# Patient Record
Sex: FEMALE | Race: WHITE | NOT HISPANIC OR LATINO | Employment: OTHER | URBAN - METROPOLITAN AREA
[De-identification: names, ages, dates, MRNs, and addresses within clinical notes are randomized per-mention and may not be internally consistent; named-entity substitution may affect disease eponyms.]

---

## 2023-02-07 PROBLEM — M06.9 RHEUMATOID ARTHRITIS (MULTI): Status: ACTIVE | Noted: 2023-02-07

## 2023-02-07 PROBLEM — H93.19 TINNITUS: Status: ACTIVE | Noted: 2023-02-07

## 2023-02-07 PROBLEM — I10 ESSENTIAL HYPERTENSION: Status: ACTIVE | Noted: 2023-02-07

## 2023-02-07 PROBLEM — F32.1 DEPRESSION, MAJOR, SINGLE EPISODE, MODERATE (MULTI): Status: ACTIVE | Noted: 2023-02-07

## 2023-02-07 PROBLEM — G89.29 CHRONIC BACK PAIN: Status: ACTIVE | Noted: 2023-02-07

## 2023-02-07 PROBLEM — N18.30 CKD (CHRONIC KIDNEY DISEASE) STAGE 3, GFR 30-59 ML/MIN (MULTI): Status: ACTIVE | Noted: 2023-02-07

## 2023-02-07 PROBLEM — E78.2 MIXED HYPERLIPIDEMIA: Status: ACTIVE | Noted: 2023-02-07

## 2023-02-07 PROBLEM — E55.9 VITAMIN D DEFICIENCY: Status: ACTIVE | Noted: 2023-02-07

## 2023-02-07 PROBLEM — M54.2 NECK PAIN: Status: ACTIVE | Noted: 2023-02-07

## 2023-02-07 PROBLEM — M54.9 CHRONIC BACK PAIN: Status: ACTIVE | Noted: 2023-02-07

## 2023-02-07 PROBLEM — M19.90 OSTEOARTHRITIS: Status: ACTIVE | Noted: 2023-02-07

## 2023-02-07 PROBLEM — R60.0 PEDAL EDEMA: Status: ACTIVE | Noted: 2023-02-07

## 2023-02-07 PROBLEM — L40.9 PSORIASIS: Status: ACTIVE | Noted: 2023-02-07

## 2023-02-07 PROBLEM — R73.03 PREDIABETES: Status: ACTIVE | Noted: 2023-02-07

## 2023-02-07 PROBLEM — L57.0 ACTINIC KERATOSIS: Status: ACTIVE | Noted: 2023-02-07

## 2023-02-07 PROBLEM — R55 SYNCOPE: Status: ACTIVE | Noted: 2023-02-07

## 2023-03-20 DIAGNOSIS — E78.2 MIXED HYPERLIPIDEMIA: Primary | ICD-10-CM

## 2023-03-20 RX ORDER — FLUOCINONIDE 0.5 MG/G
1 CREAM TOPICAL 2 TIMES DAILY
COMMUNITY
Start: 2023-02-13 | End: 2023-04-03 | Stop reason: SDUPTHER

## 2023-03-20 RX ORDER — EZETIMIBE 10 MG/1
10 TABLET ORAL DAILY
Qty: 90 TABLET | Refills: 1 | Status: SHIPPED | OUTPATIENT
Start: 2023-03-20 | End: 2023-07-03 | Stop reason: SDUPTHER

## 2023-03-20 RX ORDER — LISINOPRIL 40 MG/1
40 TABLET ORAL DAILY
COMMUNITY
Start: 2023-01-30 | End: 2023-04-03 | Stop reason: SDUPTHER

## 2023-03-20 NOTE — TELEPHONE ENCOUNTER
Patient is requesting refill on her ezetimibe 10mg to [facility] in [facility]. Pharmacy added.

## 2023-03-29 LAB
ALANINE AMINOTRANSFERASE (SGPT) (U/L) IN SER/PLAS: 9 U/L (ref 7–45)
ALBUMIN (G/DL) IN SER/PLAS: 4 G/DL (ref 3.4–5)
ALKALINE PHOSPHATASE (U/L) IN SER/PLAS: 56 U/L (ref 33–136)
ANION GAP IN SER/PLAS: 11 MMOL/L (ref 10–20)
ASPARTATE AMINOTRANSFERASE (SGOT) (U/L) IN SER/PLAS: 16 U/L (ref 9–39)
BASOPHILS (10*3/UL) IN BLOOD BY AUTOMATED COUNT: 0.05 X10E9/L (ref 0–0.1)
BASOPHILS/100 LEUKOCYTES IN BLOOD BY AUTOMATED COUNT: 0.9 % (ref 0–2)
BILIRUBIN TOTAL (MG/DL) IN SER/PLAS: 0.4 MG/DL (ref 0–1.2)
CALCIDIOL (25 OH VITAMIN D3) (NG/ML) IN SER/PLAS: 21 NG/ML
CALCIUM (MG/DL) IN SER/PLAS: 9 MG/DL (ref 8.6–10.3)
CARBON DIOXIDE, TOTAL (MMOL/L) IN SER/PLAS: 25 MMOL/L (ref 21–32)
CHLORIDE (MMOL/L) IN SER/PLAS: 110 MMOL/L (ref 98–107)
CHOLESTEROL (MG/DL) IN SER/PLAS: 265 MG/DL (ref 0–199)
CHOLESTEROL IN HDL (MG/DL) IN SER/PLAS: 53.6 MG/DL
CHOLESTEROL/HDL RATIO: 4.9
CREATININE (MG/DL) IN SER/PLAS: 0.79 MG/DL (ref 0.5–1.05)
EOSINOPHILS (10*3/UL) IN BLOOD BY AUTOMATED COUNT: 0.41 X10E9/L (ref 0–0.4)
EOSINOPHILS/100 LEUKOCYTES IN BLOOD BY AUTOMATED COUNT: 7.7 % (ref 0–6)
ERYTHROCYTE DISTRIBUTION WIDTH (RATIO) BY AUTOMATED COUNT: 14.1 % (ref 11.5–14.5)
ERYTHROCYTE MEAN CORPUSCULAR HEMOGLOBIN CONCENTRATION (G/DL) BY AUTOMATED: 32 G/DL (ref 32–36)
ERYTHROCYTE MEAN CORPUSCULAR VOLUME (FL) BY AUTOMATED COUNT: 93 FL (ref 80–100)
ERYTHROCYTES (10*6/UL) IN BLOOD BY AUTOMATED COUNT: 4.05 X10E12/L (ref 4–5.2)
GFR FEMALE: 79 ML/MIN/1.73M2
GLUCOSE (MG/DL) IN SER/PLAS: 74 MG/DL (ref 74–99)
HEMATOCRIT (%) IN BLOOD BY AUTOMATED COUNT: 37.8 % (ref 36–46)
HEMOGLOBIN (G/DL) IN BLOOD: 12.1 G/DL (ref 12–16)
IMMATURE GRANULOCYTES/100 LEUKOCYTES IN BLOOD BY AUTOMATED COUNT: 0.4 % (ref 0–0.9)
LDL: 176 MG/DL (ref 0–99)
LEUKOCYTES (10*3/UL) IN BLOOD BY AUTOMATED COUNT: 5.4 X10E9/L (ref 4.4–11.3)
LYMPHOCYTES (10*3/UL) IN BLOOD BY AUTOMATED COUNT: 1.26 X10E9/L (ref 0.8–3)
LYMPHOCYTES/100 LEUKOCYTES IN BLOOD BY AUTOMATED COUNT: 23.6 % (ref 13–44)
MONOCYTES (10*3/UL) IN BLOOD BY AUTOMATED COUNT: 0.54 X10E9/L (ref 0.05–0.8)
MONOCYTES/100 LEUKOCYTES IN BLOOD BY AUTOMATED COUNT: 10.1 % (ref 2–10)
NEUTROPHILS (10*3/UL) IN BLOOD BY AUTOMATED COUNT: 3.07 X10E9/L (ref 1.6–5.5)
NEUTROPHILS/100 LEUKOCYTES IN BLOOD BY AUTOMATED COUNT: 57.3 % (ref 40–80)
PLATELETS (10*3/UL) IN BLOOD AUTOMATED COUNT: 288 X10E9/L (ref 150–450)
POTASSIUM (MMOL/L) IN SER/PLAS: 4.2 MMOL/L (ref 3.5–5.3)
PROTEIN TOTAL: 6.8 G/DL (ref 6.4–8.2)
SODIUM (MMOL/L) IN SER/PLAS: 142 MMOL/L (ref 136–145)
THYROTROPIN (MIU/L) IN SER/PLAS BY DETECTION LIMIT <= 0.05 MIU/L: 2.08 MIU/L (ref 0.44–3.98)
TRIGLYCERIDE (MG/DL) IN SER/PLAS: 178 MG/DL (ref 0–149)
UREA NITROGEN (MG/DL) IN SER/PLAS: 17 MG/DL (ref 6–23)
VLDL: 36 MG/DL (ref 0–40)

## 2023-03-30 LAB
ESTIMATED AVERAGE GLUCOSE FOR HBA1C: 126 MG/DL
HEMOGLOBIN A1C/HEMOGLOBIN TOTAL IN BLOOD: 6 %

## 2023-04-01 PROBLEM — E66.9 CLASS 1 OBESITY WITH BODY MASS INDEX (BMI) OF 33.0 TO 33.9 IN ADULT: Status: ACTIVE | Noted: 2023-04-01

## 2023-04-01 PROBLEM — E66.811 CLASS 1 OBESITY WITH BODY MASS INDEX (BMI) OF 33.0 TO 33.9 IN ADULT: Status: ACTIVE | Noted: 2023-04-01

## 2023-04-03 ENCOUNTER — OFFICE VISIT (OUTPATIENT)
Dept: PRIMARY CARE | Facility: CLINIC | Age: 73
End: 2023-04-03
Payer: MEDICARE

## 2023-04-03 VITALS
DIASTOLIC BLOOD PRESSURE: 83 MMHG | WEIGHT: 200 LBS | OXYGEN SATURATION: 97 % | RESPIRATION RATE: 16 BRPM | BODY MASS INDEX: 35.44 KG/M2 | SYSTOLIC BLOOD PRESSURE: 127 MMHG | HEART RATE: 57 BPM | HEIGHT: 63 IN | TEMPERATURE: 98 F

## 2023-04-03 DIAGNOSIS — I10 ESSENTIAL HYPERTENSION: ICD-10-CM

## 2023-04-03 DIAGNOSIS — Z28.21 COVID-19 VIRUS VACCINATION DECLINED: ICD-10-CM

## 2023-04-03 DIAGNOSIS — Z23 NEED FOR VACCINATION: ICD-10-CM

## 2023-04-03 DIAGNOSIS — Z53.20 COLON CANCER SCREENING DECLINED: ICD-10-CM

## 2023-04-03 DIAGNOSIS — Z11.59 NEED FOR HEPATITIS C SCREENING TEST: ICD-10-CM

## 2023-04-03 DIAGNOSIS — Z53.20 MAMMOGRAM DECLINED: ICD-10-CM

## 2023-04-03 DIAGNOSIS — Z53.20 HIV SCREENING DECLINED: ICD-10-CM

## 2023-04-03 DIAGNOSIS — E78.2 MIXED HYPERLIPIDEMIA: ICD-10-CM

## 2023-04-03 DIAGNOSIS — Z53.20 OSTEOPOROSIS SCREENING DECLINED: ICD-10-CM

## 2023-04-03 DIAGNOSIS — Z00.00 HEALTH CARE MAINTENANCE: ICD-10-CM

## 2023-04-03 DIAGNOSIS — E55.9 VITAMIN D DEFICIENCY: ICD-10-CM

## 2023-04-03 DIAGNOSIS — F32.1 MODERATE MAJOR DEPRESSION (MULTI): ICD-10-CM

## 2023-04-03 DIAGNOSIS — R73.03 PREDIABETES: ICD-10-CM

## 2023-04-03 DIAGNOSIS — L40.9 PSORIASIS: ICD-10-CM

## 2023-04-03 DIAGNOSIS — Z12.11 COLON CANCER SCREENING: ICD-10-CM

## 2023-04-03 DIAGNOSIS — M06.9 RHEUMATOID ARTHRITIS, INVOLVING UNSPECIFIED SITE, UNSPECIFIED WHETHER RHEUMATOID FACTOR PRESENT (MULTI): ICD-10-CM

## 2023-04-03 DIAGNOSIS — E66.01 SEVERE OBESITY (BMI 35.0-35.9 WITH COMORBIDITY) (MULTI): ICD-10-CM

## 2023-04-03 DIAGNOSIS — Z00.00 MEDICARE ANNUAL WELLNESS VISIT, SUBSEQUENT: Primary | ICD-10-CM

## 2023-04-03 PROBLEM — R60.0 PEDAL EDEMA: Status: RESOLVED | Noted: 2023-02-07 | Resolved: 2023-04-03

## 2023-04-03 PROBLEM — N18.30 CKD (CHRONIC KIDNEY DISEASE) STAGE 3, GFR 30-59 ML/MIN (MULTI): Status: RESOLVED | Noted: 2023-02-07 | Resolved: 2023-04-03

## 2023-04-03 PROBLEM — R55 SYNCOPE: Status: RESOLVED | Noted: 2023-02-07 | Resolved: 2023-04-03

## 2023-04-03 PROCEDURE — 99214 OFFICE O/P EST MOD 30 MIN: CPT | Performed by: FAMILY MEDICINE

## 2023-04-03 PROCEDURE — 3079F DIAST BP 80-89 MM HG: CPT | Performed by: FAMILY MEDICINE

## 2023-04-03 PROCEDURE — 1160F RVW MEDS BY RX/DR IN RCRD: CPT | Performed by: FAMILY MEDICINE

## 2023-04-03 PROCEDURE — 90677 PCV20 VACCINE IM: CPT | Performed by: FAMILY MEDICINE

## 2023-04-03 PROCEDURE — G0439 PPPS, SUBSEQ VISIT: HCPCS | Performed by: FAMILY MEDICINE

## 2023-04-03 PROCEDURE — 3074F SYST BP LT 130 MM HG: CPT | Performed by: FAMILY MEDICINE

## 2023-04-03 PROCEDURE — 3008F BODY MASS INDEX DOCD: CPT | Performed by: FAMILY MEDICINE

## 2023-04-03 PROCEDURE — 1170F FXNL STATUS ASSESSED: CPT | Performed by: FAMILY MEDICINE

## 2023-04-03 PROCEDURE — 1036F TOBACCO NON-USER: CPT | Performed by: FAMILY MEDICINE

## 2023-04-03 PROCEDURE — G0009 ADMIN PNEUMOCOCCAL VACCINE: HCPCS | Performed by: FAMILY MEDICINE

## 2023-04-03 PROCEDURE — 1159F MED LIST DOCD IN RCRD: CPT | Performed by: FAMILY MEDICINE

## 2023-04-03 RX ORDER — L.ACID,FERM,PLA,RHA/B.BIF,LONG 126 MG
1 TABLET, DELAYED AND EXTENDED RELEASE ORAL DAILY
Start: 2023-04-03 | End: 2023-07-03 | Stop reason: ALTCHOICE

## 2023-04-03 RX ORDER — MAGNESIUM 250 MG
1 TABLET ORAL DAILY
Start: 2023-04-03

## 2023-04-03 RX ORDER — LISINOPRIL 40 MG/1
40 TABLET ORAL DAILY
Start: 2023-04-03 | End: 2023-07-03 | Stop reason: DRUGHIGH

## 2023-04-03 RX ORDER — FLUOXETINE HYDROCHLORIDE 20 MG/1
60 CAPSULE ORAL DAILY
Qty: 270 CAPSULE | Refills: 1 | Status: SHIPPED | OUTPATIENT
Start: 2023-04-03 | End: 2023-10-26 | Stop reason: SDUPTHER

## 2023-04-03 RX ORDER — FLUOXETINE HYDROCHLORIDE 40 MG/1
40 CAPSULE ORAL DAILY
Start: 2023-04-03 | End: 2023-04-03 | Stop reason: SDUPTHER

## 2023-04-03 RX ORDER — ACETAMINOPHEN 500 MG
50 TABLET ORAL DAILY
Start: 2023-04-03 | End: 2024-01-10 | Stop reason: DRUGHIGH

## 2023-04-03 RX ORDER — DESOXIMETASONE 2.5 MG/G
CREAM TOPICAL 2 TIMES DAILY
Start: 2023-04-03

## 2023-04-03 RX ORDER — FOLIC ACID 1 MG/1
1 TABLET ORAL DAILY
Start: 2023-04-03 | End: 2023-07-03 | Stop reason: ALTCHOICE

## 2023-04-03 RX ORDER — FLUOCINONIDE 0.5 MG/G
1 CREAM TOPICAL 2 TIMES DAILY
Start: 2023-04-03

## 2023-04-03 ASSESSMENT — ENCOUNTER SYMPTOMS
CHEST TIGHTNESS: 0
UNEXPECTED WEIGHT CHANGE: 0
CHILLS: 0
DIAPHORESIS: 0
FEVER: 0
NUMBNESS: 0
OCCASIONAL FEELINGS OF UNSTEADINESS: 1
SINUS PAIN: 0
LIGHT-HEADEDNESS: 0
DYSURIA: 0
VOMITING: 0
HEMATURIA: 0
NERVOUS/ANXIOUS: 0
WHEEZING: 0
SORE THROAT: 0
POLYPHAGIA: 0
CONFUSION: 0
COUGH: 0
LOSS OF SENSATION IN FEET: 0
DIZZINESS: 0
FREQUENCY: 0
NAUSEA: 0
CONSTIPATION: 0
ADENOPATHY: 0
SHORTNESS OF BREATH: 0
HEADACHES: 0
POLYDIPSIA: 0
SINUS PRESSURE: 0
PALPITATIONS: 0
DYSPHORIC MOOD: 0
DIARRHEA: 0
DEPRESSION: 0
ABDOMINAL PAIN: 0

## 2023-04-03 ASSESSMENT — PATIENT HEALTH QUESTIONNAIRE - PHQ9
2. FEELING DOWN, DEPRESSED OR HOPELESS: NOT AT ALL
1. LITTLE INTEREST OR PLEASURE IN DOING THINGS: NOT AT ALL
SUM OF ALL RESPONSES TO PHQ9 QUESTIONS 1 AND 2: 0

## 2023-04-03 ASSESSMENT — ACTIVITIES OF DAILY LIVING (ADL)
BATHING: INDEPENDENT
GROCERY_SHOPPING: INDEPENDENT
MANAGING_FINANCES: INDEPENDENT
TAKING_MEDICATION: INDEPENDENT
DRESSING: INDEPENDENT
DOING_HOUSEWORK: INDEPENDENT

## 2023-04-03 NOTE — PROGRESS NOTES
"Subjective   Reason for Visit: Felecia Storey is an 72 y.o. female here for a Medicare Wellness visit.     Past Medical, Surgical, and Family History reviewed and updated in chart.    Reviewed all medications by prescribing practitioner or clinical pharmacist (such as prescriptions, OTCs, herbal therapies and supplements) and documented in the medical record.  routine follow up. chronic issues as per assessment and plan.       Patient Care Team:  Nisa Barth MD as PCP - General  Nisa Barth MD as PCP [facility]     Review of Systems   Constitutional:  Negative for chills, diaphoresis, fever and unexpected weight change.   HENT:  Negative for congestion, sinus pressure, sinus pain, sneezing and sore throat.    Respiratory:  Negative for cough, chest tightness, shortness of breath and wheezing.    Cardiovascular:  Negative for chest pain, palpitations and leg swelling.   Gastrointestinal:  Negative for abdominal pain, constipation, diarrhea, nausea and vomiting.   Endocrine: Negative for cold intolerance, heat intolerance, polydipsia, polyphagia and polyuria.   Genitourinary:  Negative for dysuria, frequency, hematuria and urgency.   Neurological:  Negative for dizziness, syncope, light-headedness, numbness and headaches.   Hematological:  Negative for adenopathy.   Psychiatric/Behavioral:  Negative for confusion and dysphoric mood. The patient is not nervous/anxious.        Objective   Vitals:  /83   Pulse 57   Temp 36.7 °C (98 °F)   Resp 16   Ht 1.6 m (5' 3\")   Wt 90.7 kg (200 lb)   SpO2 97%   BMI 35.43 kg/m²       Physical Exam  Vitals and nursing note reviewed.   Constitutional:       General: She is not in acute distress.     Appearance: Normal appearance.   HENT:      Head: Normocephalic and atraumatic.      Nose: Nose normal.   Eyes:      Extraocular Movements: Extraocular movements intact.      Conjunctiva/sclera: Conjunctivae normal.      Pupils: Pupils are equal, round, " and reactive to light.   Cardiovascular:      Rate and Rhythm: Normal rate and regular rhythm.      Heart sounds: No murmur heard.     No friction rub. No gallop.   Pulmonary:      Effort: Pulmonary effort is normal.      Breath sounds: Normal breath sounds. No wheezing, rhonchi or rales.   Abdominal:      General: Bowel sounds are normal. There is no distension.      Palpations: Abdomen is soft.      Tenderness: There is no abdominal tenderness.   Musculoskeletal:         General: Normal range of motion.      Cervical back: Normal range of motion and neck supple.   Skin:     General: Skin is warm and dry.   Neurological:      General: No focal deficit present.      Mental Status: She is alert and oriented to person, place, and time.      Deep Tendon Reflexes: Reflexes normal.   Psychiatric:         Mood and Affect: Mood normal.         Behavior: Behavior normal.         Thought Content: Thought content normal.         Judgment: Judgment normal.       Orders Only on 03/29/2023   Component Date Value Ref Range Status    TSH 03/29/2023 2.08  0.44 - 3.98 mIU/L Final    Comment:  TSH testing is performed using different testing    methodology at [facility] than at other    [facility]. Direct result comparisons should    only be made within the same method.     Orders Only on 03/29/2023   Component Date Value Ref Range Status    Vitamin D, 25-Hydroxy 03/29/2023 21 (A)  ng/mL Final    Comment: .  DEFICIENCY:         < 20   NG/ML  INSUFFICIENCY:      20-29  NG/ML  SUFFICIENCY:         NG/ML    THIS ASSAY ACCURATELY QUANTIFIES THE SUM OF  VITAMIN D3, 25-HYDROXY AND VIT D2,25-HYDROXY.     Orders Only on 03/29/2023   Component Date Value Ref Range Status    Cholesterol 03/29/2023 265 (H)  0 - 199 mg/dL Final    Comment: .      AGE      DESIRABLE   BORDERLINE HIGH   HIGH     0-19 Y     0 - 169       170 - 199     >/= 200    20-24 Y     0 - 189       190 - 224     >/= 225         >24 Y     0 - 199        200 - 239     >/= 240   **All ranges are based on fasting samples. Specific   therapeutic targets will vary based on patient-specific   cardiac risk.  .   Pediatric guidelines reference:Pediatrics 2011, 128(S5).   Adult guidelines reference: NCEP ATPIII Guidelines,     MALOU 2001, 258:2486-97  .   Venipuncture immediately after or during the    administration of Metamizole may lead to falsely   low results. Testing should be performed immediately   prior to Metamizole dosing.      HDL 03/29/2023 53.6  mg/dL Final    Comment: .      AGE      VERY LOW   LOW     NORMAL    HIGH       0-19 Y       < 35   < 40     40-45     ----    20-24 Y       ----   < 40       >45     ----      >24 Y       ----   < 40     40-60      >60  .      Cholesterol/HDL Ratio 03/29/2023 4.9   Final    Comment: REF VALUES  DESIRABLE  < 3.4  HIGH RISK  > 5.0      LDL 03/29/2023 176 (H)  0 - 99 mg/dL Final    Comment: .                           NEAR      BORD      AGE      DESIRABLE  OPTIMAL    HIGH     HIGH     VERY HIGH     0-19 Y     0 - 109     ---    110-129   >/= 130     ----    20-24 Y     0 - 119     ---    120-159   >/= 160     ----      >24 Y     0 -  99   100-129  130-159   160-189     >/=190  .      VLDL 03/29/2023 36  0 - 40 mg/dL Final    Triglycerides 03/29/2023 178 (H)  0 - 149 mg/dL Final    Comment: .      AGE      DESIRABLE   BORDERLINE HIGH   HIGH     VERY HIGH   0 D-90 D    19 - 174         ----         ----        ----  91 D- 9 Y     0 -  74        75 -  99     >/= 100      ----    10-19 Y     0 -  89        90 - 129     >/= 130      ----    20-24 Y     0 - 114       115 - 149     >/= 150      ----         >24 Y     0 - 149       150 - 199    200- 499    >/= 500  .   Venipuncture immediately after or during the    administration of Metamizole may lead to falsely   low results. Testing should be performed immediately   prior to Metamizole dosing.     Orders Only on 03/29/2023   Component Date Value Ref Range Status     Hemoglobin A1C 03/29/2023 6.0 (A)  % Final    Comment:      Diagnosis of Diabetes-Adults   Non-Diabetic: < or = 5.6%   Increased risk for developing diabetes: 5.7-6.4%   Diagnostic of diabetes: > or = 6.5%  .       Monitoring of Diabetes                Age (y)     Therapeutic Goal (%)   Adults:          >18           <7.0   Pediatrics:    13-18           <7.5                   7-12           <8.0                   0- 6            7.5-8.5   American Diabetes Association. Diabetes Care 33(S1), Jan 2010.      Estimated Average Glucose 03/29/2023 126  MG/DL Final   Orders Only on 03/29/2023   Component Date Value Ref Range Status    Glucose 03/29/2023 74  74 - 99 mg/dL Final    Sodium 03/29/2023 142  136 - 145 mmol/L Final    Potassium 03/29/2023 4.2  3.5 - 5.3 mmol/L Final    Chloride 03/29/2023 110 (H)  98 - 107 mmol/L Final    Bicarbonate 03/29/2023 25  21 - 32 mmol/L Final    Anion Gap 03/29/2023 11  10 - 20 mmol/L Final    Urea Nitrogen 03/29/2023 17  6 - 23 mg/dL Final    Creatinine 03/29/2023 0.79  0.50 - 1.05 mg/dL Final    GFR Female 03/29/2023 79  >90 mL/min/1.73m2 Final    Comment:  CALCULATIONS OF ESTIMATED GFR ARE PERFORMED   USING THE 2021 CKD-EPI STUDY REFIT EQUATION   WITHOUT THE RACE VARIABLE FOR THE IDMS-TRACEABLE   CREATININE METHODS.    https://jasn.asnjournals.org/content/early/2021/09/22/ASN.4803317533      Calcium 03/29/2023 9.0  8.6 - 10.3 mg/dL Final    Albumin 03/29/2023 4.0  3.4 - 5.0 g/dL Final    Alkaline Phosphatase 03/29/2023 56  33 - 136 U/L Final    Total Protein 03/29/2023 6.8  6.4 - 8.2 g/dL Final    AST 03/29/2023 16  9 - 39 U/L Final    Total Bilirubin 03/29/2023 0.4  0.0 - 1.2 mg/dL Final    ALT (SGPT) 03/29/2023 9  7 - 45 U/L Final    Comment:  Patients treated with Sulfasalazine may generate    falsely decreased results for ALT.     Orders Only on 03/29/2023   Component Date Value Ref Range Status    WBC 03/29/2023 5.4  4.4 - 11.3 x10E9/L Final    RBC 03/29/2023 4.05  4.00 - 5.20  x10E12/L Final    Hemoglobin 03/29/2023 12.1  12.0 - 16.0 g/dL Final    Hematocrit 03/29/2023 37.8  36.0 - 46.0 % Final    MCV 03/29/2023 93  80 - 100 fL Final    MCHC 03/29/2023 32.0  32.0 - 36.0 g/dL Final    Platelets 03/29/2023 288  150 - 450 x10E9/L Final    RDW 03/29/2023 14.1  11.5 - 14.5 % Final    Neutrophils % 03/29/2023 57.3  40.0 - 80.0 % Final    Immature Granulocytes %, Automated 03/29/2023 0.4  0.0 - 0.9 % Final    Comment:  Immature Granulocyte Count (IG) includes promyelocytes,    myelocytes and metamyelocytes but does not include bands.   Percent differential counts (%) should be interpreted in the   context of the absolute cell counts (cells/L).      Lymphocytes % 03/29/2023 23.6  13.0 - 44.0 % Final    Monocytes % 03/29/2023 10.1  2.0 - 10.0 % Final    Eosinophils % 03/29/2023 7.7  0.0 - 6.0 % Final    Basophils % 03/29/2023 0.9  0.0 - 2.0 % Final    Neutrophils Absolute 03/29/2023 3.07  1.60 - 5.50 x10E9/L Final    Lymphocytes Absolute 03/29/2023 1.26  0.80 - 3.00 x10E9/L Final    Monocytes Absolute 03/29/2023 0.54  0.05 - 0.80 x10E9/L Final    Eosinophils Absolute 03/29/2023 0.41 (H)  0.00 - 0.40 x10E9/L Final    Basophils Absolute 03/29/2023 0.05  0.00 - 0.10 x10E9/L Final        Assessment/Plan   Problem List Items Addressed This Visit       BMI 35.0-35.9,adult    Current Assessment & Plan     - Encouraged healthy lifestyle, including adequate exercise and high fiber, low fat and low carb diet.          Relevant Orders    Follow Up In Primary Care    Vitamin B12    Essential hypertension    Current Assessment & Plan     - blood pressure elevated at 140/90 on initial check but improved to 127/93 on recheck  - Denies chest pain and shortness of breath.   - on lisinopril 40 mg daily. Continue          Relevant Medications    lisinopril 40 mg tablet    Other Relevant Orders    Follow Up In Primary Care    Vitamin B12    Medicare annual wellness visit, subsequent - Primary    Mixed hyperlipidemia     Current Assessment & Plan     - cholesterol continues to be high.  (175) (189) (254)  - continue zetia  - have discusseded statins in the past  - she refuses statin because of concerns of side effects  - plans to work on diet and exercise  - recheck in 3 months          Relevant Orders    Hepatic Function Panel    Lipid Panel    Follow Up In Primary Care    Vitamin B12    Moderate major depression (CMS/[facility])    Current Assessment & Plan     - controlled. Continue fluoxetine          Relevant Medications    FLUoxetine (PROzac) 40 mg capsule    Other Relevant Orders    Follow Up In Primary Care    Vitamin B12    Need for hepatitis C screening test    Current Assessment & Plan     - declines          Relevant Orders    Follow Up In Primary Care    Prediabetes    Current Assessment & Plan     - HbA1c 6 (6) (6.3)  - Encouraged healthy lifestyle, including adequate exercise and high fiber, low fat and low carb diet.   - recheck HbA1c March 2024         Relevant Orders    Follow Up In Primary Care    Vitamin B12    Psoriasis    Current Assessment & Plan     - cholesterol continues to be high but improved.  (189) (254)  - continue zetia  - have discusseded statins in the past  - she refuses statin because of concerns of side effects         Relevant Medications    desoximetasone (Topicort) 0.25 % cream    fluocinonide 0.05 % cream    Other Relevant Orders    Follow Up In Primary Care    Rheumatoid arthritis (CMS/[facility])    Current Assessment & Plan     stable. has seen Dr. Emerson in the past but not currenty. she does not want to take any of the RA medications         Relevant Orders    Follow Up In Primary Care    Vitamin B12    Severe obesity (BMI 35.0-35.9 with comorbidity) (CMS/[facility])    Current Assessment & Plan     - Encouraged healthy lifestyle, including adequate exercise and high fiber, low fat and low carb diet.          Relevant Orders    Follow Up In Primary Care    Vitamin B12    Vitamin D deficiency     Current Assessment & Plan     - vitamin D low at 21. Had been out of vitamin D but will be restarting   - recheck in 3 months          Relevant Medications    cholecalciferol (Vitamin D-3) 50 mcg (2,000 unit) capsule    Other Relevant Orders    Vitamin D, Total    Follow Up In Primary Care    Vitamin B12     Other Visit Diagnoses       Mammogram declined        Relevant Orders    Follow Up In Primary Care    Colon cancer screening declined        Relevant Orders    Follow Up In Primary Care    Osteoporosis screening declined        Relevant Orders    Follow Up In Primary Care    Health care maintenance        Relevant Medications    folic acid (Folvite) 1 mg tablet    L.acid,ferm,tushar,rha-B.bif,long (Controlled Delivery Probiotic) 126 mg (2 billion cell) tablet,delayed and ext.release    magnesium 250 mg tablet    Other Relevant Orders    Follow Up In Primary Care    COVID-19 virus vaccination declined        Relevant Orders    Follow Up In Primary Care    HIV screening declined        Relevant Orders    Follow Up In Primary Care    Need for vaccination        Relevant Medications    zoster vaccine-recombinant adjuvanted (Shingrix) 50 mcg/0.5 mL vaccine    diphth,pertus,acell,,tetanus (BoostRIX) 2.5-8-5 Lf-mcg-Lf/0.5mL injection    Other Relevant Orders    Pneumococcal conjugate vaccine 20-valent IM    Follow Up In Primary Care

## 2023-04-03 NOTE — PATIENT INSTRUCTIONS
Felecia Storey ,    Thank you for coming in today. We at [facility] appreciate your trust in our care. If you have any questions or concerns about the care you received today, please do not hesitate to contact us at [phone].    The following instructions were discussed today:    - Follow up in 3 months.    - Please get blood work done 1-2 weeks prior to your next visit.   - For blood work: Nothing to eat or drink for at least 10 hours prior. Okay for water or black coffee.

## 2023-04-03 NOTE — ASSESSMENT & PLAN NOTE
- cholesterol continues to be high.  (175) (189) (254)  - continue zetia  - have discusseded statins in the past  - she refuses statin because of concerns of side effects  - plans to work on diet and exercise  - recheck in 3 months

## 2023-04-03 NOTE — ASSESSMENT & PLAN NOTE
- Encouraged healthy lifestyle, including adequate exercise and high fiber, low fat and low carb diet.

## 2023-04-03 NOTE — ASSESSMENT & PLAN NOTE
- blood pressure elevated at 140/90 on initial check but improved to 127/93 on recheck  - Denies chest pain and shortness of breath.   - on lisinopril 40 mg daily. Continue

## 2023-04-03 NOTE — ASSESSMENT & PLAN NOTE
stable. has seen Dr. Emerson in the past but not currenty. she does not want to take any of the RA medications

## 2023-04-03 NOTE — ASSESSMENT & PLAN NOTE
- HbA1c 6 (6) (6.3)  - Encouraged healthy lifestyle, including adequate exercise and high fiber, low fat and low carb diet.   - recheck HbA1c March 2024

## 2023-07-03 ENCOUNTER — OFFICE VISIT (OUTPATIENT)
Dept: PRIMARY CARE | Facility: CLINIC | Age: 73
End: 2023-07-03
Payer: MEDICARE

## 2023-07-03 VITALS
HEIGHT: 64 IN | TEMPERATURE: 98.8 F | OXYGEN SATURATION: 95 % | WEIGHT: 196 LBS | DIASTOLIC BLOOD PRESSURE: 76 MMHG | SYSTOLIC BLOOD PRESSURE: 119 MMHG | BODY MASS INDEX: 33.46 KG/M2 | HEART RATE: 70 BPM | RESPIRATION RATE: 16 BRPM

## 2023-07-03 DIAGNOSIS — L40.9 PSORIASIS: ICD-10-CM

## 2023-07-03 DIAGNOSIS — E78.2 MIXED HYPERLIPIDEMIA: ICD-10-CM

## 2023-07-03 DIAGNOSIS — I10 ESSENTIAL HYPERTENSION: ICD-10-CM

## 2023-07-03 DIAGNOSIS — R55 SYNCOPE, UNSPECIFIED SYNCOPE TYPE: ICD-10-CM

## 2023-07-03 DIAGNOSIS — E55.9 VITAMIN D DEFICIENCY: ICD-10-CM

## 2023-07-03 DIAGNOSIS — F32.1 MODERATE MAJOR DEPRESSION (MULTI): Primary | ICD-10-CM

## 2023-07-03 DIAGNOSIS — R20.0 NUMBNESS OF FINGERS OF BOTH HANDS: ICD-10-CM

## 2023-07-03 DIAGNOSIS — Z29.89 SBE (SUBACUTE BACTERIAL ENDOCARDITIS) PROPHYLAXIS CANDIDATE: ICD-10-CM

## 2023-07-03 DIAGNOSIS — E66.09 CLASS 1 OBESITY DUE TO EXCESS CALORIES WITH SERIOUS COMORBIDITY AND BODY MASS INDEX (BMI) OF 34.0 TO 34.9 IN ADULT: ICD-10-CM

## 2023-07-03 DIAGNOSIS — M06.9 RHEUMATOID ARTHRITIS, INVOLVING UNSPECIFIED SITE, UNSPECIFIED WHETHER RHEUMATOID FACTOR PRESENT (MULTI): ICD-10-CM

## 2023-07-03 DIAGNOSIS — R73.03 PREDIABETES: ICD-10-CM

## 2023-07-03 DIAGNOSIS — I45.2 BIFASCICULAR BLOCK: ICD-10-CM

## 2023-07-03 PROBLEM — Z96.653 PRESENCE OF ARTIFICIAL KNEE JOINT, BILATERAL: Status: RESOLVED | Noted: 2020-02-18 | Resolved: 2023-07-03

## 2023-07-03 PROBLEM — M75.122 COMPLETE ROTATOR CUFF TEAR OR RUPTURE OF LEFT SHOULDER, NOT SPECIFIED AS TRAUMATIC: Status: RESOLVED | Noted: 2018-06-21 | Resolved: 2023-07-03

## 2023-07-03 PROBLEM — Z00.00 MEDICARE ANNUAL WELLNESS VISIT, SUBSEQUENT: Status: RESOLVED | Noted: 2023-04-03 | Resolved: 2023-07-03

## 2023-07-03 PROBLEM — N17.9 AKI (ACUTE KIDNEY INJURY) (CMS-HCC): Status: RESOLVED | Noted: 2018-02-08 | Resolved: 2023-07-03

## 2023-07-03 PROBLEM — M43.17 SPONDYLOLISTHESIS AT L5-S1 LEVEL: Status: ACTIVE | Noted: 2018-11-07

## 2023-07-03 PROBLEM — Z11.59 NEED FOR HEPATITIS C SCREENING TEST: Status: RESOLVED | Noted: 2023-04-03 | Resolved: 2023-07-03

## 2023-07-03 PROBLEM — Z23 ENCOUNTER FOR IMMUNIZATION: Status: RESOLVED | Noted: 2023-04-03 | Resolved: 2023-07-03

## 2023-07-03 PROCEDURE — 3008F BODY MASS INDEX DOCD: CPT | Performed by: FAMILY MEDICINE

## 2023-07-03 PROCEDURE — 99214 OFFICE O/P EST MOD 30 MIN: CPT | Performed by: FAMILY MEDICINE

## 2023-07-03 PROCEDURE — 3078F DIAST BP <80 MM HG: CPT | Performed by: FAMILY MEDICINE

## 2023-07-03 PROCEDURE — 3074F SYST BP LT 130 MM HG: CPT | Performed by: FAMILY MEDICINE

## 2023-07-03 PROCEDURE — 1159F MED LIST DOCD IN RCRD: CPT | Performed by: FAMILY MEDICINE

## 2023-07-03 PROCEDURE — 1036F TOBACCO NON-USER: CPT | Performed by: FAMILY MEDICINE

## 2023-07-03 PROCEDURE — 1160F RVW MEDS BY RX/DR IN RCRD: CPT | Performed by: FAMILY MEDICINE

## 2023-07-03 RX ORDER — AMOXICILLIN 500 MG/1
2000 TABLET, FILM COATED ORAL ONCE
Qty: 4 TABLET | Refills: 2 | Status: SHIPPED | OUTPATIENT
Start: 2023-07-03 | End: 2023-07-03

## 2023-07-03 RX ORDER — LISINOPRIL 20 MG/1
20 TABLET ORAL DAILY
Qty: 90 TABLET | Refills: 1
Start: 2023-07-03 | End: 2024-05-02 | Stop reason: SDUPTHER

## 2023-07-03 RX ORDER — LISINOPRIL 40 MG/1
20 TABLET ORAL DAILY
Status: SHIPPED
Start: 2023-07-03 | End: 2023-07-03 | Stop reason: DRUGHIGH

## 2023-07-03 RX ORDER — EZETIMIBE 10 MG/1
10 TABLET ORAL DAILY
Qty: 90 TABLET | Refills: 1 | Status: SHIPPED | OUTPATIENT
Start: 2023-07-03 | End: 2023-09-19 | Stop reason: SDUPTHER

## 2023-07-03 ASSESSMENT — ENCOUNTER SYMPTOMS
VOMITING: 0
FEVER: 0
LIGHT-HEADEDNESS: 0
DIAPHORESIS: 0
ADENOPATHY: 0
CHEST TIGHTNESS: 0
SINUS PRESSURE: 0
WHEEZING: 0
NERVOUS/ANXIOUS: 0
HEADACHES: 0
CONFUSION: 0
NAUSEA: 0
UNEXPECTED WEIGHT CHANGE: 0
FREQUENCY: 0
NUMBNESS: 0
DIZZINESS: 0
SHORTNESS OF BREATH: 0
PALPITATIONS: 0
CHILLS: 0
SINUS PAIN: 0
DYSURIA: 0
SORE THROAT: 0
ABDOMINAL PAIN: 0
CONSTIPATION: 0
COUGH: 0
DYSPHORIC MOOD: 0
HEMATURIA: 0
POLYDIPSIA: 0
DIARRHEA: 0
POLYPHAGIA: 0

## 2023-07-03 ASSESSMENT — PATIENT HEALTH QUESTIONNAIRE - PHQ9
SUM OF ALL RESPONSES TO PHQ9 QUESTIONS 1 AND 2: 0
1. LITTLE INTEREST OR PLEASURE IN DOING THINGS: NOT AT ALL
2. FEELING DOWN, DEPRESSED OR HOPELESS: NOT AT ALL

## 2023-07-03 NOTE — ASSESSMENT & PLAN NOTE
- will be seeing cardiology, Dr. Elizabeth  - has had negative carotid dopplers  - echo with normal LV function and aortic valve sclerosis  - patient  concerned about amyloidosis, which is further down on differential given normal echo but will check SPEP UPEP

## 2023-07-03 NOTE — ASSESSMENT & PLAN NOTE
- cholesterol continues to be high but improved.  (189) (254)  - continue zetia  - have discusseded statins in the past  - she refuses statin because of concerns of side effects

## 2023-07-03 NOTE — ASSESSMENT & PLAN NOTE
- cholesterol continues to be high.  (175) (189) (254)  - continue zetia  - have discusseded statins in the past  - she refuses statin because of concerns of side effects  - plans to work on diet and exercise  - recheck lipids now

## 2023-07-03 NOTE — PROGRESS NOTES
Subjective   Patient ID: Felecia Storey is a 73 y.o. female who presents for follow up (Will be seeing Dr. Elizabeth July 23rd.  Pt concerned she may have ATTR-CM, /Also wants to talk about kidney concern, /Would like US of her abdomen, fell on her abdomen approximately 4 wks ago.).    routine follow up. chronic issues as per assessment and plan.     She is worried about possible heart disease. She saw a commercial on television mentioning ATTR-CM and she is wondering if she may have that. She gets tired easily. Has had issues with carpal tunnel in the past and has had surgery 20-25 years ago on both wrists. Starting the past few months, has had numbness of both hands when she sleeps. Notices especially the pinkie finger on her right hand. The commerical she saw did mention carpal tunnel so that is why she is worried. She did schedule an appointment with cardiology, Dr. Elizabeth. Will be seeing him July 23. She did previously see [facility], Dr. Corona, for an episode of syncope. She had event monitor that was negative. Her EKG showed sinus rhythm with bifascicular block. He did order stress test but she has not had this. He also wanted her to see electrophysiology. She wanted to see Dr. Elizabeth for second opinion. She has had echocardiogram in 9/2022 which showed -->  1) Left ventricular systolic function is normal with a 60-65% estimated ejection fraction. 2) Aortic valve sclerosis.           Review of Systems   Constitutional:  Negative for chills, diaphoresis, fever and unexpected weight change.   HENT:  Negative for congestion, sinus pressure, sinus pain, sneezing and sore throat.    Respiratory:  Negative for cough, chest tightness, shortness of breath and wheezing.    Cardiovascular:  Negative for chest pain, palpitations and leg swelling.   Gastrointestinal:  Negative for abdominal pain, constipation, diarrhea, nausea and vomiting.   Endocrine: Negative for cold intolerance, heat intolerance, polydipsia,  "polyphagia and polyuria.   Genitourinary:  Negative for dysuria, frequency, hematuria and urgency.   Neurological:  Negative for dizziness, syncope, light-headedness, numbness and headaches.   Hematological:  Negative for adenopathy.   Psychiatric/Behavioral:  Negative for confusion and dysphoric mood. The patient is not nervous/anxious.        Objective   /76   Pulse 70   Temp 37.1 °C (98.8 °F)   Resp 16   Ht 1.613 m (5' 3.5\")   Wt 88.9 kg (196 lb)   SpO2 95%   BMI 34.18 kg/m²     Physical Exam  Vitals and nursing note reviewed.   Constitutional:       General: She is not in acute distress.     Appearance: Normal appearance.   HENT:      Head: Normocephalic and atraumatic.      Nose: Nose normal.   Eyes:      Extraocular Movements: Extraocular movements intact.      Conjunctiva/sclera: Conjunctivae normal.      Pupils: Pupils are equal, round, and reactive to light.   Cardiovascular:      Rate and Rhythm: Normal rate and regular rhythm.      Heart sounds: No murmur heard.     No friction rub. No gallop.   Pulmonary:      Effort: Pulmonary effort is normal.      Breath sounds: Normal breath sounds. No wheezing, rhonchi or rales.   Abdominal:      General: Bowel sounds are normal. There is no distension.      Palpations: Abdomen is soft.      Tenderness: There is no abdominal tenderness.   Musculoskeletal:         General: Normal range of motion.      Cervical back: Normal range of motion and neck supple.   Skin:     General: Skin is warm and dry.   Neurological:      General: No focal deficit present.      Mental Status: She is alert and oriented to person, place, and time.      Deep Tendon Reflexes: Reflexes normal.   Psychiatric:         Mood and Affect: Mood normal.         Behavior: Behavior normal.         Thought Content: Thought content normal.         Judgment: Judgment normal.         Assessment/Plan   Problem List Items Addressed This Visit       Bifascicular block     - will be seeing " cardiology, Dr. Elizabeth         Relevant Orders    Serum Protein Electrophoresis    Urine Protein Electrophoresis    [place]/Lambda Free Light Chain, Serum    BMI 34.0-34.9,adult     - Encouraged healthy lifestyle, including adequate exercise and high fiber, low fat and low carb diet.          Class 1 obesity due to excess calories with serious comorbidity and body mass index (BMI) of 34.0 to 34.9 in adult     - Encouraged healthy lifestyle, including adequate exercise and high fiber, low fat and low carb diet.          Relevant Orders    Vitamin B12    Essential hypertension     - current on lisinopril 20 mg daily         Relevant Medications    lisinopril 20 mg tablet    Other Relevant Orders    Vitamin B12    Mixed hyperlipidemia     - cholesterol continues to be high.  (175) (276) (041)  - continue zetia  - have discusseded statins in the past  - she refuses statin because of concerns of side effects  - plans to work on diet and exercise  - recheck lipids now          Relevant Medications    ezetimibe (Zetia) 10 mg tablet    Other Relevant Orders    Hepatic Function Panel    Lipid Panel    Vitamin B12    Moderate major depression (CMS/HCC) - Primary     - controlled. Continue fluoxetine          Relevant Orders    Vitamin B12    Follow Up In Primary Care - Established    Numbness of fingers of both hands     - check EMG/ NCS         Relevant Orders    EMG & nerve conduction    Prediabetes     - HbA1c 6 (6) (6.3)  - Encouraged healthy lifestyle, including adequate exercise and high fiber, low fat and low carb diet.   - recheck HbA1c March 2024         Relevant Orders    Vitamin B12    Psoriasis     - cholesterol continues to be high but improved.  (189) (254)  - continue zetia  - have discusseded statins in the past  - she refuses statin because of concerns of side effects         Rheumatoid arthritis (CMS/HCC)     stable. has seen Dr. Emerson in the past but not currenty. she does not want to take any of  the RA medications         Relevant Orders    Vitamin B12    SBE (subacute bacterial endocarditis) prophylaxis candidate    Relevant Medications    amoxicillin (Amoxil) 500 mg tablet    Syncope     - will be seeing cardiology, Dr. Elizabeth  - has had negative carotid dopplers  - echo with normal LV function and aortic valve sclerosis  - patient  concerned about amyloidosis, which is further down on differential given normal echo but will check SPEP UPEP         Relevant Orders    Serum Protein Electrophoresis    Urine Protein Electrophoresis    [place]/Lambda Free Light Chain, Serum    Vitamin D deficiency     - check vitamin D         Relevant Orders    Vitamin D, Total    Vitamin B12

## 2023-07-03 NOTE — PATIENT INSTRUCTIONS
Felecia Storey ,    Thank you for coming in today. We at [facility] appreciate your trust in our care. If you have any questions or concerns about the care you received today, please do not hesitate to contact us at [phone].    The following instructions were discussed today:    - get nerve study   - get labs   - For blood work: Nothing to eat or drink for at least 10 hours prior. Okay for water or black coffee.

## 2023-07-10 ENCOUNTER — PATIENT OUTREACH (OUTPATIENT)
Dept: PRIMARY CARE | Facility: CLINIC | Age: 73
End: 2023-07-10
Payer: MEDICARE

## 2023-07-10 NOTE — PROGRESS NOTES
Discharge Facility: [facility]  Discharge Diagnosis: Chest pain  Admission Date: 7 July 23  Discharge Date: 7 July 23    PCP Appointment Date: tasked to PCP office for scheduling.   Specialist Appointment Date: 25 July 23 (Cardiology)   Hospital Encounter and Summary: Not available at this time    No contact made on 7 July 23 discharge outreach attempt. Tasked to PCP office for scheduling. Will attempt outreach again in 2 wks.

## 2023-07-14 ENCOUNTER — TELEMEDICINE (OUTPATIENT)
Dept: PRIMARY CARE | Facility: CLINIC | Age: 73
End: 2023-07-14
Payer: MEDICARE

## 2023-07-14 DIAGNOSIS — R06.83 SNORING: ICD-10-CM

## 2023-07-14 DIAGNOSIS — G47.19 EXCESSIVE DAYTIME SLEEPINESS: Primary | ICD-10-CM

## 2023-07-14 PROCEDURE — 99496 TRANSJ CARE MGMT HIGH F2F 7D: CPT | Performed by: FAMILY MEDICINE

## 2023-07-14 NOTE — PROGRESS NOTES
"Patient: Felecia Storey  : 1950  PCP: Nisa Barth MD  MRN: [id number]  Program: No linked episodes    Virtual or Telephone Consent    An interactive audio and video telecommunication system which permits real time communications between the patient (at the originating site) and provider (at the distant site) was utilized to provide this telehealth service.   Verbal consent was requested and obtained from Felecia Storey on this date, 23 for a telehealth visit.       Felecia Storey is a 73 y.o. female presenting today for follow-up after being discharged from the hospital 7 days ago. The main problem requiring admission was chest pain. The discharge summary and/or Transitional Care Management documentation was reviewed. Medication reconciliation was performed as indicated via the \"[place] as Reviewed\" timestamp.     Felecia tSorey was contacted by Transitional Care Management services two days after her discharge. This encounter and supporting documentation was reviewed.    The complexity of medical decision making for this patient's transitional care is moderate.    Was hospitalized tfor chest pain. While in the ED blood work completed did not show any anemia, infection, or electrolyte imbalances . EKG and troponin blood work did not show any evidence of ischemia. Ychest xray that did not show any abnormalities . CTA chest, abdomen, pelvis showed no acute findings.  You were brought into the observation unit for further work-up and evaluation.     Cardiac stress test showed no evidence of inducible ischemia and was considered to be a low risk scan.    cholesterol  247, triglycerides 92, HDL  47,  . W  Feeling much better. Thinks her chest pain was secondary to panic attack. Will be seeing Dr. Elizabeth on 23.     She is having daytime fatigue. She does snore at night.               Review of Systems   Constitutional:  Negative for chills, diaphoresis, fever and unexpected weight change. "   HENT:  Negative for congestion, sinus pressure, sinus pain, sneezing and sore throat.    Respiratory:  Negative for cough, chest tightness, shortness of breath and wheezing.    Cardiovascular:  Negative for chest pain, palpitations and leg swelling.   Gastrointestinal:  Negative for abdominal pain, constipation, diarrhea, nausea and vomiting.   Endocrine: Negative for cold intolerance, heat intolerance, polydipsia, polyphagia and polyuria.   Genitourinary:  Negative for dysuria, frequency, hematuria and urgency.   Neurological:  Negative for dizziness, syncope, light-headedness, numbness and headaches.   Hematological:  Negative for adenopathy.   Psychiatric/Behavioral:  Negative for confusion and dysphoric mood. The patient is not nervous/anxious.         Assessment/Plan   Problem List Items Addressed This Visit       Excessive daytime sleepiness - Primary     - check sleep study          Relevant Orders    Home sleep apnea test (HSAT) (Completed)    Snoring     - check sleep study         Relevant Orders    Home sleep apnea test (HSAT) (Completed)           Family History   Problem Relation Name Age of Onset    Other (HTN) Mother      Cerebral aneurysm Mother      Coronary artery disease Father      Hyperlipidemia Father      Lung cancer Father      Other (HTN) Father         No data recorded    No follow-ups on file.

## 2023-07-14 NOTE — PATIENT INSTRUCTIONS
Felecia Storey ,    Thank you for coming in today. We at [facility] appreciate your trust in our care. If you have any questions or concerns about the care you received today, please do not hesitate to contact us at [phone].    The following instructions were discussed today:    - Follow up 10/16/2023 as scheduled.

## 2023-07-14 NOTE — ASSESSMENT & PLAN NOTE
>>ASSESSMENT AND PLAN FOR RICHARD (OBSTRUCTIVE SLEEP APNEA) WRITTEN ON 5/16/2024  3:14 PM BY KASEY CABRERA MD    >>ASSESSMENT AND PLAN FOR SNORING WRITTEN ON 7/14/2023 11:03 AM BY KASEY CABRERA MD    - check sleep study    >>ASSESSMENT AND PLAN FOR EXCESSIVE DAYTIME SLEEPINESS WRITTEN ON 7/14/2023 11:02 AM BY KASEY CABRERA MD    - check sleep study

## 2023-07-17 ENCOUNTER — TELEPHONE (OUTPATIENT)
Dept: PRIMARY CARE | Facility: CLINIC | Age: 73
End: 2023-07-17
Payer: MEDICARE

## 2023-07-17 NOTE — TELEPHONE ENCOUNTER
----- Message from Nisa Barth MD sent at 7/14/2023 11:05 AM EDT -----  Did virtual with patient. She needs set up for home sleep study

## 2023-07-20 ENCOUNTER — PATIENT OUTREACH (OUTPATIENT)
Dept: PRIMARY CARE | Facility: CLINIC | Age: 73
End: 2023-07-20
Payer: MEDICARE

## 2023-07-27 ENCOUNTER — LAB (OUTPATIENT)
Dept: LAB | Facility: LAB | Age: 73
End: 2023-07-27
Payer: MEDICARE

## 2023-07-27 DIAGNOSIS — M06.9 RHEUMATOID ARTHRITIS, INVOLVING UNSPECIFIED SITE, UNSPECIFIED WHETHER RHEUMATOID FACTOR PRESENT (MULTI): ICD-10-CM

## 2023-07-27 DIAGNOSIS — I10 ESSENTIAL HYPERTENSION: ICD-10-CM

## 2023-07-27 DIAGNOSIS — R55 SYNCOPE, UNSPECIFIED SYNCOPE TYPE: ICD-10-CM

## 2023-07-27 DIAGNOSIS — R73.03 PREDIABETES: ICD-10-CM

## 2023-07-27 DIAGNOSIS — F32.1 MODERATE MAJOR DEPRESSION (MULTI): ICD-10-CM

## 2023-07-27 DIAGNOSIS — E78.2 MIXED HYPERLIPIDEMIA: ICD-10-CM

## 2023-07-27 DIAGNOSIS — E66.09 CLASS 1 OBESITY DUE TO EXCESS CALORIES WITH SERIOUS COMORBIDITY AND BODY MASS INDEX (BMI) OF 34.0 TO 34.9 IN ADULT: ICD-10-CM

## 2023-07-27 DIAGNOSIS — I45.2 BIFASCICULAR BLOCK: ICD-10-CM

## 2023-07-27 DIAGNOSIS — E55.9 VITAMIN D DEFICIENCY: ICD-10-CM

## 2023-07-27 LAB
ALANINE AMINOTRANSFERASE (SGPT) (U/L) IN SER/PLAS: 8 U/L (ref 7–45)
ALBUMIN (G/DL) IN SER/PLAS: 4.2 G/DL (ref 3.4–5)
ALKALINE PHOSPHATASE (U/L) IN SER/PLAS: 58 U/L (ref 33–136)
ASPARTATE AMINOTRANSFERASE (SGOT) (U/L) IN SER/PLAS: 16 U/L (ref 9–39)
BILIRUBIN DIRECT (MG/DL) IN SER/PLAS: 0.1 MG/DL (ref 0–0.3)
BILIRUBIN TOTAL (MG/DL) IN SER/PLAS: 0.5 MG/DL (ref 0–1.2)
CALCIDIOL (25 OH VITAMIN D3) (NG/ML) IN SER/PLAS: 18 NG/ML
CHOLESTEROL (MG/DL) IN SER/PLAS: 273 MG/DL (ref 0–199)
CHOLESTEROL IN HDL (MG/DL) IN SER/PLAS: 54.1 MG/DL
CHOLESTEROL/HDL RATIO: 5
COBALAMIN (VITAMIN B12) (PG/ML) IN SER/PLAS: 202 PG/ML (ref 211–911)
CREATININE (MG/DL) IN URINE: 126 MG/DL (ref 20–320)
LDL: 183 MG/DL (ref 0–99)
PROTEIN (MG/DL) IN URINE: 8 MG/DL (ref 5–24)
PROTEIN (MG/DL) IN URINE: 8 MG/DL (ref 5–24)
PROTEIN TOTAL: 6.7 G/DL (ref 6.4–8.2)
PROTEIN TOTAL: 6.7 G/DL (ref 6.4–8.2)
PROTEIN/CREATININE (MG/MG) IN URINE: 0.06 MG/MG CREAT (ref 0–0.17)
TRIGLYCERIDE (MG/DL) IN SER/PLAS: 182 MG/DL (ref 0–149)
VLDL: 36 MG/DL (ref 0–40)

## 2023-07-27 PROCEDURE — 80076 HEPATIC FUNCTION PANEL: CPT

## 2023-07-27 PROCEDURE — 86325 OTHER IMMUNOELECTROPHORESIS: CPT | Performed by: STUDENT IN AN ORGANIZED HEALTH CARE EDUCATION/TRAINING PROGRAM

## 2023-07-27 PROCEDURE — 82607 VITAMIN B-12: CPT

## 2023-07-27 PROCEDURE — 86335 IMMUNFIX E-PHORSIS/URINE/CSF: CPT

## 2023-07-27 PROCEDURE — 84165 PROTEIN E-PHORESIS SERUM: CPT | Performed by: STUDENT IN AN ORGANIZED HEALTH CARE EDUCATION/TRAINING PROGRAM

## 2023-07-27 PROCEDURE — 86320 SERUM IMMUNOELECTROPHORESIS: CPT | Performed by: STUDENT IN AN ORGANIZED HEALTH CARE EDUCATION/TRAINING PROGRAM

## 2023-07-27 PROCEDURE — 83883 ASSAY NEPHELOMETRY NOT SPEC: CPT

## 2023-07-27 PROCEDURE — 84165 PROTEIN E-PHORESIS SERUM: CPT

## 2023-07-27 PROCEDURE — 82570 ASSAY OF URINE CREATININE: CPT

## 2023-07-27 PROCEDURE — 80061 LIPID PANEL: CPT

## 2023-07-27 PROCEDURE — 84166 PROTEIN E-PHORESIS/URINE/CSF: CPT

## 2023-07-27 PROCEDURE — 84156 ASSAY OF PROTEIN URINE: CPT

## 2023-07-27 PROCEDURE — 84166 PROTEIN E-PHORESIS/URINE/CSF: CPT | Performed by: STUDENT IN AN ORGANIZED HEALTH CARE EDUCATION/TRAINING PROGRAM

## 2023-07-27 PROCEDURE — 86334 IMMUNOFIX E-PHORESIS SERUM: CPT

## 2023-07-27 PROCEDURE — 82306 VITAMIN D 25 HYDROXY: CPT

## 2023-07-27 PROCEDURE — 36415 COLL VENOUS BLD VENIPUNCTURE: CPT

## 2023-07-28 LAB
IMMUNOGLOBULIN LIGHT CHAINS KAPPA/LAMBDA (MASS RATIO) IN SERUM: 3.71 (ref 0.26–1.65)
IMMUNOGLOBULIN LIGHT CHAINS.KAPPA (MG/DL) IN SERUM: 4.9 MG/DL (ref 0.33–1.94)
IMMUNOGLOBULIN LIGHT CHAINS.LAMBDA (MG/DL) IN SERUM: 1.32 MG/DL (ref 0.57–2.63)

## 2023-08-01 LAB
ALBUMIN ELP: 4.1 G/DL (ref 3.4–5)
ALBUMIN/PROTEIN TOTAL (%) IN URINE BY ELECTROPHORESIS: 45 %
ALPHA 1 GLOBULIN/PROTEIN TOTAL (%) IN URINE BY ELECTROPHORESIS: 5.9 %
ALPHA 1: 0.2 G/DL (ref 0.2–0.6)
ALPHA 2 GLOBULIN/PROTEIN TOTAL (%) IN URINE BY ELECTROPHORESIS: 14.4 %
ALPHA 2: 0.6 G/DL (ref 0.4–1.1)
BETA GLOBULIN/PROTEIN TOTAL (%) IN URINE BY ELECTROPHORESIS: 25.6 %
BETA: 0.9 G/DL (ref 0.5–1.2)
GAMMA GLOBULIN/PROTEIN TOTAL (%) IN URINE BY ELECTROPHORESIS: 9.1 %
GAMMA GLOBULIN: 0.9 G/DL (ref 0.5–1.4)
IMMUNOFIXATION INTERPRETATION: ABNORMAL
M-PROTEIN 1 URINE %: 2.9 %
M-PROTEIN 1: 0.2 G/DL
PATH REVIEW - SERUM IMMUNOFIXATION: NORMAL
PATH REVIEW - URINE IMMUNOFIXATION: NORMAL
PATH REVIEW-SERUM PROTEIN ELECTROPHORESIS: NORMAL
PATH REVIEW-URINE PROTEIN ELECTROPHORESIS: NORMAL
PROTEIN (MG/DL) IN URINE: 8 MG/DL (ref 5–24)
PROTEIN ELECTROPHORESIS INTERPRETATION: ABNORMAL
PROTEIN TOTAL: 6.7 G/DL (ref 6.4–8.2)
SERUM IMMUNOFIXATION INTERPRETATION: ABNORMAL
UPEP INTERPRETATION: ABNORMAL

## 2023-08-07 DIAGNOSIS — R06.83 SNORING: ICD-10-CM

## 2023-08-07 DIAGNOSIS — G47.19 EXCESSIVE DAYTIME SLEEPINESS: ICD-10-CM

## 2023-08-07 PROBLEM — R07.9 CHEST PAIN: Status: ACTIVE | Noted: 2023-08-07

## 2023-08-07 ASSESSMENT — ENCOUNTER SYMPTOMS
DYSURIA: 0
SINUS PAIN: 0
DYSPHORIC MOOD: 0
ADENOPATHY: 0
NAUSEA: 0
CHEST TIGHTNESS: 0
FREQUENCY: 0
DIARRHEA: 0
HEADACHES: 0
POLYPHAGIA: 0
DIZZINESS: 0
CHILLS: 0
DIAPHORESIS: 0
SORE THROAT: 0
UNEXPECTED WEIGHT CHANGE: 0
WHEEZING: 0
NERVOUS/ANXIOUS: 0
CONFUSION: 0
VOMITING: 0
CONSTIPATION: 0
POLYDIPSIA: 0
SHORTNESS OF BREATH: 0
HEMATURIA: 0
LIGHT-HEADEDNESS: 0
NUMBNESS: 0
SINUS PRESSURE: 0
ABDOMINAL PAIN: 0
PALPITATIONS: 0
FEVER: 0
COUGH: 0

## 2023-09-19 DIAGNOSIS — E78.2 MIXED HYPERLIPIDEMIA: ICD-10-CM

## 2023-09-19 RX ORDER — EZETIMIBE 10 MG/1
10 TABLET ORAL DAILY
Qty: 90 TABLET | Refills: 1 | Status: SHIPPED | OUTPATIENT
Start: 2023-09-19 | End: 2024-05-02 | Stop reason: SDUPTHER

## 2023-10-16 ENCOUNTER — APPOINTMENT (OUTPATIENT)
Dept: PRIMARY CARE | Facility: CLINIC | Age: 73
End: 2023-10-16
Payer: MEDICARE

## 2023-10-26 DIAGNOSIS — F32.1 MODERATE MAJOR DEPRESSION (MULTI): ICD-10-CM

## 2023-10-26 RX ORDER — FLUOXETINE HYDROCHLORIDE 20 MG/1
60 CAPSULE ORAL DAILY
Qty: 270 CAPSULE | Refills: 1 | Status: SHIPPED | OUTPATIENT
Start: 2023-10-26 | End: 2024-04-23

## 2024-01-08 ENCOUNTER — LAB (OUTPATIENT)
Dept: LAB | Facility: LAB | Age: 74
End: 2024-01-08
Payer: MEDICARE

## 2024-01-08 DIAGNOSIS — G47.19 EXCESSIVE DAYTIME SLEEPINESS: ICD-10-CM

## 2024-01-08 DIAGNOSIS — E78.2 MIXED HYPERLIPIDEMIA: ICD-10-CM

## 2024-01-08 DIAGNOSIS — E55.9 VITAMIN D DEFICIENCY: ICD-10-CM

## 2024-01-08 DIAGNOSIS — L40.9 PSORIASIS: ICD-10-CM

## 2024-01-08 DIAGNOSIS — I10 ESSENTIAL HYPERTENSION: ICD-10-CM

## 2024-01-08 DIAGNOSIS — E66.09 CLASS 1 OBESITY DUE TO EXCESS CALORIES WITH SERIOUS COMORBIDITY AND BODY MASS INDEX (BMI) OF 34.0 TO 34.9 IN ADULT: ICD-10-CM

## 2024-01-08 DIAGNOSIS — M06.9 RHEUMATOID ARTHRITIS, INVOLVING UNSPECIFIED SITE, UNSPECIFIED WHETHER RHEUMATOID FACTOR PRESENT (MULTI): ICD-10-CM

## 2024-01-08 DIAGNOSIS — R73.03 PREDIABETES: ICD-10-CM

## 2024-01-08 DIAGNOSIS — R20.0 NUMBNESS OF FINGERS OF BOTH HANDS: ICD-10-CM

## 2024-01-08 DIAGNOSIS — F32.1 MODERATE MAJOR DEPRESSION (MULTI): ICD-10-CM

## 2024-01-08 LAB
25(OH)D3 SERPL-MCNC: 19 NG/ML (ref 30–100)
ALBUMIN SERPL BCP-MCNC: 4.3 G/DL (ref 3.4–5)
ALP SERPL-CCNC: 60 U/L (ref 33–136)
ALT SERPL W P-5'-P-CCNC: 14 U/L (ref 7–45)
ANION GAP SERPL CALC-SCNC: 13 MMOL/L (ref 10–20)
AST SERPL W P-5'-P-CCNC: 20 U/L (ref 9–39)
BASOPHILS # BLD AUTO: 0.05 X10*3/UL (ref 0–0.1)
BASOPHILS NFR BLD AUTO: 0.9 %
BILIRUB SERPL-MCNC: 0.7 MG/DL (ref 0–1.2)
BUN SERPL-MCNC: 24 MG/DL (ref 6–23)
CALCIUM SERPL-MCNC: 9.1 MG/DL (ref 8.6–10.3)
CHLORIDE SERPL-SCNC: 106 MMOL/L (ref 98–107)
CHOLEST SERPL-MCNC: 213 MG/DL (ref 0–199)
CHOLESTEROL/HDL RATIO: 3.3
CO2 SERPL-SCNC: 24 MMOL/L (ref 21–32)
CREAT SERPL-MCNC: 0.87 MG/DL (ref 0.5–1.05)
EGFRCR SERPLBLD CKD-EPI 2021: 71 ML/MIN/1.73M*2
EOSINOPHIL # BLD AUTO: 0.28 X10*3/UL (ref 0–0.4)
EOSINOPHIL NFR BLD AUTO: 4.9 %
ERYTHROCYTE [DISTWIDTH] IN BLOOD BY AUTOMATED COUNT: 14 % (ref 11.5–14.5)
EST. AVERAGE GLUCOSE BLD GHB EST-MCNC: 128 MG/DL
GLUCOSE SERPL-MCNC: 75 MG/DL (ref 74–99)
HBA1C MFR BLD: 6.1 %
HCT VFR BLD AUTO: 38.8 % (ref 36–46)
HDLC SERPL-MCNC: 65.5 MG/DL
HGB BLD-MCNC: 12.6 G/DL (ref 12–16)
IMM GRANULOCYTES # BLD AUTO: 0.02 X10*3/UL (ref 0–0.5)
IMM GRANULOCYTES NFR BLD AUTO: 0.3 % (ref 0–0.9)
LDLC SERPL CALC-MCNC: 125 MG/DL
LYMPHOCYTES # BLD AUTO: 1.38 X10*3/UL (ref 0.8–3)
LYMPHOCYTES NFR BLD AUTO: 24 %
MCH RBC QN AUTO: 30.5 PG (ref 26–34)
MCHC RBC AUTO-ENTMCNC: 32.5 G/DL (ref 32–36)
MCV RBC AUTO: 94 FL (ref 80–100)
MONOCYTES # BLD AUTO: 0.57 X10*3/UL (ref 0.05–0.8)
MONOCYTES NFR BLD AUTO: 9.9 %
NEUTROPHILS # BLD AUTO: 3.45 X10*3/UL (ref 1.6–5.5)
NEUTROPHILS NFR BLD AUTO: 60 %
NON HDL CHOLESTEROL: 148 MG/DL (ref 0–149)
NRBC BLD-RTO: 0 /100 WBCS (ref 0–0)
PLATELET # BLD AUTO: 299 X10*3/UL (ref 150–450)
POTASSIUM SERPL-SCNC: 4.4 MMOL/L (ref 3.5–5.3)
PROT SERPL-MCNC: 7 G/DL (ref 6.4–8.2)
RBC # BLD AUTO: 4.13 X10*6/UL (ref 4–5.2)
SODIUM SERPL-SCNC: 139 MMOL/L (ref 136–145)
TRIGL SERPL-MCNC: 113 MG/DL (ref 0–149)
TSH SERPL-ACNC: 2.08 MIU/L (ref 0.44–3.98)
VIT B12 SERPL-MCNC: 224 PG/ML (ref 211–911)
VLDL: 23 MG/DL (ref 0–40)
WBC # BLD AUTO: 5.8 X10*3/UL (ref 4.4–11.3)

## 2024-01-08 PROCEDURE — 36415 COLL VENOUS BLD VENIPUNCTURE: CPT

## 2024-01-08 PROCEDURE — 82607 VITAMIN B-12: CPT

## 2024-01-08 PROCEDURE — 84443 ASSAY THYROID STIM HORMONE: CPT

## 2024-01-08 PROCEDURE — 82306 VITAMIN D 25 HYDROXY: CPT

## 2024-01-08 PROCEDURE — 80053 COMPREHEN METABOLIC PANEL: CPT

## 2024-01-08 PROCEDURE — 83036 HEMOGLOBIN GLYCOSYLATED A1C: CPT

## 2024-01-08 PROCEDURE — 85025 COMPLETE CBC W/AUTO DIFF WBC: CPT

## 2024-01-08 PROCEDURE — 80061 LIPID PANEL: CPT

## 2024-01-10 ENCOUNTER — APPOINTMENT (OUTPATIENT)
Dept: PRIMARY CARE | Facility: CLINIC | Age: 74
End: 2024-01-10
Payer: MEDICARE

## 2024-01-10 ENCOUNTER — TELEPHONE (OUTPATIENT)
Dept: PRIMARY CARE | Facility: CLINIC | Age: 74
End: 2024-01-10

## 2024-01-10 DIAGNOSIS — E55.9 VITAMIN D DEFICIENCY: Primary | ICD-10-CM

## 2024-01-10 PROBLEM — Z78.9 STATIN INTOLERANCE: Status: ACTIVE | Noted: 2024-01-10

## 2024-01-10 RX ORDER — CHOLECALCIFEROL (VITAMIN D3) 125 MCG
125 CAPSULE ORAL DAILY
Qty: 90 CAPSULE | Refills: 3 | Status: SHIPPED | OUTPATIENT
Start: 2024-01-10 | End: 2025-01-09

## 2024-01-10 NOTE — TELEPHONE ENCOUNTER
1) cholesterol is improved. Continue zetia    2) Please let patient know blood sugar is elevated. This does not mean diabetes, but it does mean increased risk for developing diabetes. Please send low carb/ diabetic diet and recommend increase exercise.     3) Please let patient know that vitamin D level is low. I recommend increasing vitamin D to 5000 IU daily. Recheck vitamin D level in about 3 months (just prior to her next visit with me). Order placed.     4) Remainder of lab work normal.

## 2024-01-10 NOTE — TELEPHONE ENCOUNTER
Patient went ahead and canceled appt for today. She did want to know if you can review labs she had done a few days ago. I did go over some of it with her, like lipid and cmp, but I was unsure about the rest. It does look like her vitamin d is low

## 2024-04-03 ENCOUNTER — APPOINTMENT (OUTPATIENT)
Dept: PRIMARY CARE | Facility: CLINIC | Age: 74
End: 2024-04-03
Payer: MEDICARE

## 2024-04-08 ENCOUNTER — TELEPHONE (OUTPATIENT)
Dept: PRIMARY CARE | Facility: CLINIC | Age: 74
End: 2024-04-08
Payer: MEDICARE

## 2024-04-08 DIAGNOSIS — R73.03 PREDIABETES: Primary | ICD-10-CM

## 2024-04-08 DIAGNOSIS — E78.2 MIXED HYPERLIPIDEMIA: ICD-10-CM

## 2024-04-08 NOTE — TELEPHONE ENCOUNTER
Patient would like to go tomorrow or Wednesday morning to have her Vitamin D repeated, patient would like to have her Lipid Panel rechecked and her sugar. Please advise

## 2024-05-02 DIAGNOSIS — E78.2 MIXED HYPERLIPIDEMIA: ICD-10-CM

## 2024-05-02 DIAGNOSIS — I10 ESSENTIAL HYPERTENSION: ICD-10-CM

## 2024-05-03 RX ORDER — EZETIMIBE 10 MG/1
10 TABLET ORAL DAILY
Qty: 90 TABLET | Refills: 1 | Status: SHIPPED | OUTPATIENT
Start: 2024-05-03 | End: 2024-10-30

## 2024-05-03 RX ORDER — LISINOPRIL 20 MG/1
20 TABLET ORAL DAILY
Qty: 90 TABLET | Refills: 1 | Status: SHIPPED | OUTPATIENT
Start: 2024-05-03 | End: 2024-05-16 | Stop reason: DRUGHIGH

## 2024-05-14 ENCOUNTER — LAB (OUTPATIENT)
Dept: LAB | Facility: LAB | Age: 74
End: 2024-05-14
Payer: MEDICARE

## 2024-05-14 DIAGNOSIS — E78.2 MIXED HYPERLIPIDEMIA: ICD-10-CM

## 2024-05-14 DIAGNOSIS — R73.03 PREDIABETES: ICD-10-CM

## 2024-05-14 DIAGNOSIS — E55.9 VITAMIN D DEFICIENCY: ICD-10-CM

## 2024-05-14 LAB
25(OH)D3 SERPL-MCNC: 27 NG/ML (ref 30–100)
ALBUMIN SERPL BCP-MCNC: 4.2 G/DL (ref 3.4–5)
ALP SERPL-CCNC: 54 U/L (ref 33–136)
ALT SERPL W P-5'-P-CCNC: 16 U/L (ref 7–45)
AST SERPL W P-5'-P-CCNC: 21 U/L (ref 9–39)
BILIRUB DIRECT SERPL-MCNC: 0.1 MG/DL (ref 0–0.3)
BILIRUB SERPL-MCNC: 0.5 MG/DL (ref 0–1.2)
CHOLEST SERPL-MCNC: 177 MG/DL (ref 0–199)
CHOLESTEROL/HDL RATIO: 2.4
HDLC SERPL-MCNC: 73 MG/DL
LDLC SERPL CALC-MCNC: 87 MG/DL
NON HDL CHOLESTEROL: 104 MG/DL (ref 0–149)
PROT SERPL-MCNC: 7 G/DL (ref 6.4–8.2)
TRIGL SERPL-MCNC: 85 MG/DL (ref 0–149)
VLDL: 17 MG/DL (ref 0–40)

## 2024-05-14 PROCEDURE — 80061 LIPID PANEL: CPT

## 2024-05-14 PROCEDURE — 82306 VITAMIN D 25 HYDROXY: CPT

## 2024-05-14 PROCEDURE — 80076 HEPATIC FUNCTION PANEL: CPT

## 2024-05-14 PROCEDURE — 36415 COLL VENOUS BLD VENIPUNCTURE: CPT

## 2024-05-14 PROCEDURE — 83036 HEMOGLOBIN GLYCOSYLATED A1C: CPT

## 2024-05-15 LAB
EST. AVERAGE GLUCOSE BLD GHB EST-MCNC: 123 MG/DL
HBA1C MFR BLD: 5.9 %

## 2024-05-16 ENCOUNTER — OFFICE VISIT (OUTPATIENT)
Dept: PRIMARY CARE | Facility: CLINIC | Age: 74
End: 2024-05-16
Payer: MEDICARE

## 2024-05-16 VITALS
WEIGHT: 215 LBS | DIASTOLIC BLOOD PRESSURE: 80 MMHG | SYSTOLIC BLOOD PRESSURE: 100 MMHG | HEIGHT: 64 IN | BODY MASS INDEX: 36.7 KG/M2 | RESPIRATION RATE: 16 BRPM | OXYGEN SATURATION: 99 % | HEART RATE: 65 BPM

## 2024-05-16 DIAGNOSIS — Z23 ENCOUNTER FOR IMMUNIZATION: ICD-10-CM

## 2024-05-16 DIAGNOSIS — I71.21 ANEURYSM OF ASCENDING AORTA WITHOUT RUPTURE (CMS-HCC): ICD-10-CM

## 2024-05-16 DIAGNOSIS — G47.33 OSA (OBSTRUCTIVE SLEEP APNEA): ICD-10-CM

## 2024-05-16 DIAGNOSIS — I65.29 STENOSIS OF CAROTID ARTERY, UNSPECIFIED LATERALITY: ICD-10-CM

## 2024-05-16 DIAGNOSIS — F32.1 MODERATE MAJOR DEPRESSION (MULTI): ICD-10-CM

## 2024-05-16 DIAGNOSIS — R73.03 PREDIABETES: ICD-10-CM

## 2024-05-16 DIAGNOSIS — I10 ESSENTIAL HYPERTENSION: ICD-10-CM

## 2024-05-16 DIAGNOSIS — Z78.0 POSTMENOPAUSAL: ICD-10-CM

## 2024-05-16 DIAGNOSIS — R20.0 NUMBNESS OF FINGERS OF BOTH HANDS: ICD-10-CM

## 2024-05-16 DIAGNOSIS — Z12.11 COLON CANCER SCREENING: ICD-10-CM

## 2024-05-16 DIAGNOSIS — Z12.31 VISIT FOR SCREENING MAMMOGRAM: ICD-10-CM

## 2024-05-16 DIAGNOSIS — M06.9 RHEUMATOID ARTHRITIS, INVOLVING UNSPECIFIED SITE, UNSPECIFIED WHETHER RHEUMATOID FACTOR PRESENT (MULTI): ICD-10-CM

## 2024-05-16 DIAGNOSIS — L40.9 PSORIASIS: ICD-10-CM

## 2024-05-16 DIAGNOSIS — I45.2 BIFASCICULAR BLOCK: ICD-10-CM

## 2024-05-16 DIAGNOSIS — E78.2 MIXED HYPERLIPIDEMIA: ICD-10-CM

## 2024-05-16 DIAGNOSIS — Z00.00 MEDICARE ANNUAL WELLNESS VISIT, SUBSEQUENT: Primary | ICD-10-CM

## 2024-05-16 DIAGNOSIS — E66.01 CLASS 2 SEVERE OBESITY DUE TO EXCESS CALORIES WITH SERIOUS COMORBIDITY AND BODY MASS INDEX (BMI) OF 37.0 TO 37.9 IN ADULT (MULTI): ICD-10-CM

## 2024-05-16 DIAGNOSIS — E55.9 VITAMIN D DEFICIENCY: ICD-10-CM

## 2024-05-16 PROBLEM — R55 SYNCOPE: Status: RESOLVED | Noted: 2023-02-07 | Resolved: 2024-05-16

## 2024-05-16 PROBLEM — L57.0 ACTINIC KERATOSIS: Status: RESOLVED | Noted: 2023-02-07 | Resolved: 2024-05-16

## 2024-05-16 PROBLEM — E66.812 CLASS 2 SEVERE OBESITY DUE TO EXCESS CALORIES WITH SERIOUS COMORBIDITY AND BODY MASS INDEX (BMI) OF 37.0 TO 37.9 IN ADULT: Status: ACTIVE | Noted: 2023-04-01

## 2024-05-16 PROBLEM — R07.9 CHEST PAIN: Status: RESOLVED | Noted: 2023-08-07 | Resolved: 2024-05-16

## 2024-05-16 PROCEDURE — 1123F ACP DISCUSS/DSCN MKR DOCD: CPT | Performed by: FAMILY MEDICINE

## 2024-05-16 PROCEDURE — 1036F TOBACCO NON-USER: CPT | Performed by: FAMILY MEDICINE

## 2024-05-16 PROCEDURE — 3074F SYST BP LT 130 MM HG: CPT | Performed by: FAMILY MEDICINE

## 2024-05-16 PROCEDURE — 1160F RVW MEDS BY RX/DR IN RCRD: CPT | Performed by: FAMILY MEDICINE

## 2024-05-16 PROCEDURE — 1170F FXNL STATUS ASSESSED: CPT | Performed by: FAMILY MEDICINE

## 2024-05-16 PROCEDURE — 1159F MED LIST DOCD IN RCRD: CPT | Performed by: FAMILY MEDICINE

## 2024-05-16 PROCEDURE — 3008F BODY MASS INDEX DOCD: CPT | Performed by: FAMILY MEDICINE

## 2024-05-16 PROCEDURE — 1158F ADVNC CARE PLAN TLK DOCD: CPT | Performed by: FAMILY MEDICINE

## 2024-05-16 PROCEDURE — G0439 PPPS, SUBSEQ VISIT: HCPCS | Performed by: FAMILY MEDICINE

## 2024-05-16 PROCEDURE — 3079F DIAST BP 80-89 MM HG: CPT | Performed by: FAMILY MEDICINE

## 2024-05-16 PROCEDURE — 99214 OFFICE O/P EST MOD 30 MIN: CPT | Performed by: FAMILY MEDICINE

## 2024-05-16 RX ORDER — LISINOPRIL 10 MG/1
10 TABLET ORAL DAILY
COMMUNITY
Start: 2024-02-01 | End: 2024-05-16 | Stop reason: SDUPTHER

## 2024-05-16 RX ORDER — LISINOPRIL 20 MG/1
20 TABLET ORAL DAILY
Start: 2024-05-16 | End: 2024-11-12

## 2024-05-16 RX ORDER — METOPROLOL SUCCINATE 25 MG/1
1 TABLET, EXTENDED RELEASE ORAL
COMMUNITY
Start: 2024-02-01 | End: 2024-05-16 | Stop reason: SDUPTHER

## 2024-05-16 RX ORDER — ATORVASTATIN CALCIUM 20 MG/1
1 TABLET, FILM COATED ORAL NIGHTLY
COMMUNITY
Start: 2024-02-01 | End: 2024-05-16 | Stop reason: SDUPTHER

## 2024-05-16 RX ORDER — METOPROLOL SUCCINATE 25 MG/1
25 TABLET, EXTENDED RELEASE ORAL
Start: 2024-05-16 | End: 2024-05-16 | Stop reason: SDDI

## 2024-05-16 RX ORDER — ATORVASTATIN CALCIUM 20 MG/1
20 TABLET, FILM COATED ORAL NIGHTLY
Start: 2024-05-16

## 2024-05-16 RX ORDER — LISINOPRIL 10 MG/1
10 TABLET ORAL DAILY
Start: 2024-05-16 | End: 2024-05-16 | Stop reason: DRUGHIGH

## 2024-05-16 ASSESSMENT — ENCOUNTER SYMPTOMS
FREQUENCY: 0
POLYPHAGIA: 0
HEMATURIA: 0
CONSTIPATION: 0
UNEXPECTED WEIGHT CHANGE: 0
ABDOMINAL PAIN: 0
WHEEZING: 0
DIAPHORESIS: 0
HEADACHES: 0
DYSURIA: 0
NUMBNESS: 0
VOMITING: 0
SINUS PAIN: 0
LIGHT-HEADEDNESS: 0
DIARRHEA: 0
PALPITATIONS: 0
ADENOPATHY: 0
SHORTNESS OF BREATH: 0
CHEST TIGHTNESS: 0
COUGH: 0
CHILLS: 0
DYSPHORIC MOOD: 0
NERVOUS/ANXIOUS: 0
CONFUSION: 0
NAUSEA: 0
SINUS PRESSURE: 0
FEVER: 0
SORE THROAT: 0
POLYDIPSIA: 0
DIZZINESS: 0

## 2024-05-16 ASSESSMENT — ACTIVITIES OF DAILY LIVING (ADL)
BATHING: INDEPENDENT
DRESSING: INDEPENDENT
GROCERY_SHOPPING: INDEPENDENT
MANAGING_FINANCES: INDEPENDENT
TAKING_MEDICATION: INDEPENDENT
DOING_HOUSEWORK: INDEPENDENT

## 2024-05-16 NOTE — PATIENT INSTRUCTIONS
Felecia Storey ,    Thank you for coming in today. We at [facility] appreciate your trust in our care. If you have any questions or concerns about the care you received today, please do not hesitate to contact us at [phone].    The following instructions were discussed today:    - get bone density (DEXA) scan  - I recommend the following vaccines at the pharmacy: Shingrix (shingles), Tdap (tetanus), RSV  - get nerve conduction study  - Follow up in 6 months

## 2024-05-16 NOTE — ASSESSMENT & PLAN NOTE
- HbA1c 5.9 (6_ (6) (6.3)  - Encouraged healthy lifestyle, including adequate exercise and high fiber, low fat and low carb diet.

## 2024-05-16 NOTE — PROGRESS NOTES
Subjective   Reason for Visit: Felecia Storey is an 74 y.o. female here for a Medicare Wellness visit.     Past Medical, Surgical, and Family History reviewed and updated in chart.    Reviewed all medications by prescribing practitioner or clinical pharmacist (such as prescriptions, OTCs, herbal therapies and supplements) and documented in the medical record.    HPI  routine follow up. chronic issues as per assessment and plan.     Saw cardiology on 2/1/24. Note reviewed. This was follow up on aortic aneurysm. Was started on Toprol XL 25 mg daily and lisinopril was decreased to 10 mg daily. Atorvastatin was increased to 20 mg daily. She did not start the Toprol XL and is still taking lisinopril 20 mg daily     Reviewed5/14/24 labs --> HbA1c 5.9 (6.1) (6); vitamin D 27 (19) (18); chol 177 (213) (273); HDL 73 (65.6) (54.1); LDL 87 (125) (183); TG 85 (113) (182)    Patient Care Team:  Nisa Barth MD as PCP - General  Nisa Barth MD as PCP - [facility] Attributed Provider  Ivan Elizabeth MD as Referring Physician (Cardiology)     Review of Systems   Constitutional:  Negative for chills, diaphoresis, fever and unexpected weight change.   HENT:  Negative for congestion, sinus pressure, sinus pain, sneezing and sore throat.    Respiratory:  Negative for cough, chest tightness, shortness of breath and wheezing.    Cardiovascular:  Negative for chest pain, palpitations and leg swelling.   Gastrointestinal:  Negative for abdominal pain, constipation, diarrhea, nausea and vomiting.   Endocrine: Negative for cold intolerance, heat intolerance, polydipsia, polyphagia and polyuria.   Genitourinary:  Negative for dysuria, frequency, hematuria and urgency.   Neurological:  Negative for dizziness, syncope, light-headedness, numbness and headaches.   Hematological:  Negative for adenopathy.   Psychiatric/Behavioral:  Negative for confusion and dysphoric mood. The patient is not nervous/anxious.        Objective   Vitals:  BP  "100/80 (BP Location: Right arm, Patient Position: Sitting, BP Cuff Size: Large adult)   Pulse 65   Resp 16   Ht 1.613 m (5' 3.5\")   Wt 97.5 kg (215 lb)   SpO2 99%   BMI 37.49 kg/m²       Physical Exam  Vitals and nursing note reviewed.   Constitutional:       General: She is not in acute distress.     Appearance: Normal appearance.   HENT:      Head: Normocephalic and atraumatic.      Nose: Nose normal.   Eyes:      Extraocular Movements: Extraocular movements intact.      Conjunctiva/sclera: Conjunctivae normal.      Pupils: Pupils are equal, round, and reactive to light.   Cardiovascular:      Rate and Rhythm: Normal rate and regular rhythm.      Heart sounds: No murmur heard.     No friction rub. No gallop.   Pulmonary:      Effort: Pulmonary effort is normal.      Breath sounds: Normal breath sounds. No wheezing, rhonchi or rales.   Abdominal:      General: Bowel sounds are normal. There is no distension.      Palpations: Abdomen is soft.      Tenderness: There is no abdominal tenderness.   Musculoskeletal:         General: Normal range of motion.      Cervical back: Normal range of motion and neck supple.   Skin:     General: Skin is warm and dry.   Neurological:      General: No focal deficit present.      Mental Status: She is alert and oriented to person, place, and time.      Deep Tendon Reflexes: Reflexes normal.   Psychiatric:         Mood and Affect: Mood normal.         Behavior: Behavior normal.         Thought Content: Thought content normal.         Judgment: Judgment normal.         Assessment/Plan   Problem List Items Addressed This Visit       Aneurysm of ascending aorta without rupture (CMS-HCC)    Current Assessment & Plan     - follows with cardiology, Dr. Elizabeth         Bifascicular block    Current Assessment & Plan     - follows with cardiology, Dr. Elizabeth         BMI 37.0-37.9, adult    Current Assessment & Plan     - Encouraged healthy lifestyle, including adequate exercise and high " fiber, low fat and low carb diet.          Carotid artery stenosis    Current Assessment & Plan     - follows with cardiology, Dr. Elizabeth         Class 2 severe obesity due to excess calories with serious comorbidity and body mass index (BMI) of 37.0 to 37.9 in adult (Multi)    Current Assessment & Plan     - Encouraged healthy lifestyle, including adequate exercise and high fiber, low fat and low carb diet.          Colon cancer screening    Current Assessment & Plan     - declines at this time but would like to pursue once she loses some weight          Encounter for immunization    Current Assessment & Plan     - recommended the following vaccines at the pharmacy: Shingrix (shingles), Tdap (tetanus), RSV         Essential hypertension    Current Assessment & Plan     - controlled  - current regimen: lisinopril 10 mg daily, Toprol XL 25 mg daily          Relevant Medications    lisinopril 20 mg tablet    Mixed hyperlipidemia    Current Assessment & Plan     - cardiology has started her on atorvastatin and her cholesterol has improved. LDL 87 (125) (183)         Relevant Medications    atorvastatin (Lipitor) 20 mg tablet    Moderate major depression (Multi)    Current Assessment & Plan     - controlled. Continue fluoxetine          Numbness of fingers of both hands    Current Assessment & Plan     - does note this is a little better  - check EMG/ NCS         Relevant Orders    EMG & nerve conduction    RICHARD (obstructive sleep apnea)    Current Assessment & Plan     - sleep study from 8/2023 showed moderate- severe sleep apnea  - she declines CPAP therapy   - she is working on weight loss         Prediabetes    Current Assessment & Plan     - HbA1c 5.9 (6_ (6) (6.3)  - Encouraged healthy lifestyle, including adequate exercise and high fiber, low fat and low carb diet.            Psoriasis    Current Assessment & Plan     - follows with dermatology          Rheumatoid arthritis (Multi)    Current Assessment & Plan      stable. has seen Dr. Emerson in the past but not currenty. she does not want to take any of the RA medications         Visit for screening mammogram    Current Assessment & Plan     - declines          Vitamin D deficiency    Current Assessment & Plan     - continue vitamin D          Other Visit Diagnoses       Medicare annual wellness visit, subsequent    -  Primary    Postmenopausal        Relevant Orders    XR DEXA bone density

## 2024-05-16 NOTE — ASSESSMENT & PLAN NOTE
- sleep study from 8/2023 showed moderate- severe sleep apnea  - she declines CPAP therapy   - she is working on weight loss

## 2024-07-11 DIAGNOSIS — F32.1 MODERATE MAJOR DEPRESSION (MULTI): ICD-10-CM

## 2024-07-11 RX ORDER — FLUOXETINE HYDROCHLORIDE 20 MG/1
60 CAPSULE ORAL DAILY
Qty: 270 CAPSULE | Refills: 1 | Status: SHIPPED | OUTPATIENT
Start: 2024-07-11 | End: 2025-01-07

## 2024-08-13 DIAGNOSIS — I10 ESSENTIAL HYPERTENSION: ICD-10-CM

## 2024-08-13 RX ORDER — LISINOPRIL 20 MG/1
20 TABLET ORAL DAILY
Qty: 90 TABLET | Refills: 1 | Status: SHIPPED | OUTPATIENT
Start: 2024-08-13 | End: 2025-02-09

## 2024-09-26 ENCOUNTER — TELEPHONE (OUTPATIENT)
Dept: PRIMARY CARE | Facility: CLINIC | Age: 74
End: 2024-09-26
Payer: MEDICARE

## 2024-09-26 NOTE — TELEPHONE ENCOUNTER
Pt lm stating she wants to get blood work done, can you please order? She has not felt good for several weeks    Heart enzymes, kidney,liver,vit d,cancer level(white and red blood cell) ALT/STPT  AST/STOT, lipid panel

## 2024-10-04 ENCOUNTER — TELEMEDICINE (OUTPATIENT)
Dept: PRIMARY CARE | Facility: CLINIC | Age: 74
End: 2024-10-04
Payer: MEDICARE

## 2024-10-04 DIAGNOSIS — E55.9 VITAMIN D DEFICIENCY: Primary | ICD-10-CM

## 2024-10-04 DIAGNOSIS — R53.83 FATIGUE, UNSPECIFIED TYPE: ICD-10-CM

## 2024-10-04 DIAGNOSIS — G47.33 OSA (OBSTRUCTIVE SLEEP APNEA): ICD-10-CM

## 2024-10-04 PROCEDURE — 99442 PR PHYS/QHP TELEPHONE EVALUATION 11-20 MIN: CPT | Performed by: FAMILY MEDICINE

## 2024-10-04 PROCEDURE — 1159F MED LIST DOCD IN RCRD: CPT | Performed by: FAMILY MEDICINE

## 2024-10-04 PROCEDURE — 1036F TOBACCO NON-USER: CPT | Performed by: FAMILY MEDICINE

## 2024-10-04 PROCEDURE — 1160F RVW MEDS BY RX/DR IN RCRD: CPT | Performed by: FAMILY MEDICINE

## 2024-10-04 ASSESSMENT — ENCOUNTER SYMPTOMS
DYSURIA: 0
DIZZINESS: 0
POLYDIPSIA: 0
COUGH: 0
FEVER: 0
UNEXPECTED WEIGHT CHANGE: 0
LIGHT-HEADEDNESS: 0
CHILLS: 0
CONSTIPATION: 0
SHORTNESS OF BREATH: 0
HEADACHES: 0
ADENOPATHY: 0
POLYPHAGIA: 0
DIAPHORESIS: 0
SINUS PRESSURE: 0
PALPITATIONS: 0
HEMATURIA: 0
NUMBNESS: 0
NAUSEA: 0
VOMITING: 0
SORE THROAT: 0
DYSPHORIC MOOD: 0
FREQUENCY: 0
NERVOUS/ANXIOUS: 0
FATIGUE: 1
CHEST TIGHTNESS: 0
SINUS PAIN: 0
WHEEZING: 0
DIARRHEA: 0
ABDOMINAL PAIN: 0
CONFUSION: 0

## 2024-10-04 NOTE — PROGRESS NOTES
Subjective   Patient ID: Felecia Storey is a 74 y.o. female who presents for Fatigue.    Virtual or Telephone Consent    A telephone visit (audio only) between the patient (at the originating site) and the provider (at the distant site) was utilized to provide this telehealth service.   Verbal consent was requested and obtained from Felecia Storey on this date, 10/04/24 for a telehealth visit.      HPI  Acute visit for fatigue. She states she gets very exhausted through the day. She thinks it might be because of weight gain but she is not sure. She has gained a lot of abdominal weight. Denies chest pain or shortness of breath. She does have moderate-severe sleep apnea and cannot tolerate her CPAP.     Review of Systems   Constitutional:  Positive for fatigue. Negative for chills, diaphoresis, fever and unexpected weight change.   HENT:  Negative for congestion, sinus pressure, sinus pain, sneezing and sore throat.    Respiratory:  Negative for cough, chest tightness, shortness of breath and wheezing.    Cardiovascular:  Negative for chest pain, palpitations and leg swelling.   Gastrointestinal:  Negative for abdominal pain, constipation, diarrhea, nausea and vomiting.   Endocrine: Negative for cold intolerance, heat intolerance, polydipsia, polyphagia and polyuria.   Genitourinary:  Negative for dysuria, frequency, hematuria and urgency.   Neurological:  Negative for dizziness, syncope, light-headedness, numbness and headaches.   Hematological:  Negative for adenopathy.   Psychiatric/Behavioral:  Negative for confusion and dysphoric mood. The patient is not nervous/anxious.          Assessment/Plan   Problem List Items Addressed This Visit       Fatigue     - likely secondary to weight gain, deconditioning, and untreated sleep apnea  - start with checking labs          Relevant Orders    Vitamin B12    Comprehensive Metabolic Panel    CBC and Auto Differential    Vitamin D 25-Hydroxy,Total (for eval of Vitamin D  levels)    TSH with reflex to Free T4 if abnormal    RICHARD (obstructive sleep apnea)     - moderate to severe sleep apnea on sleep study  - having a difficult time tolerating her APAP.   - I recommended referral to sleep medicine. She would like to discuss this more at her 11/18/24  - also may be a candidate for Inspire          Relevant Orders    Vitamin B12    Comprehensive Metabolic Panel    CBC and Auto Differential    Vitamin D 25-Hydroxy,Total (for eval of Vitamin D levels)    TSH with reflex to Free T4 if abnormal    Vitamin D deficiency - Primary    Relevant Orders    Vitamin B12    Comprehensive Metabolic Panel    CBC and Auto Differential    Vitamin D 25-Hydroxy,Total (for eval of Vitamin D levels)    TSH with reflex to Free T4 if abnormal       This telephone visit lasted approximately 12 minutes.

## 2024-10-04 NOTE — ASSESSMENT & PLAN NOTE
- likely secondary to weight gain, deconditioning, and untreated sleep apnea  - start with checking labs

## 2024-10-04 NOTE — ASSESSMENT & PLAN NOTE
- moderate to severe sleep apnea on sleep study  - having a difficult time tolerating her APAP.   - I recommended referral to sleep medicine. She would like to discuss this more at her 11/18/24  - also may be a candidate for Manan

## 2024-10-14 ENCOUNTER — LAB (OUTPATIENT)
Dept: LAB | Facility: LAB | Age: 74
End: 2024-10-14
Payer: MEDICARE

## 2024-10-14 DIAGNOSIS — E55.9 VITAMIN D DEFICIENCY: ICD-10-CM

## 2024-10-14 DIAGNOSIS — G47.33 OSA (OBSTRUCTIVE SLEEP APNEA): ICD-10-CM

## 2024-10-14 DIAGNOSIS — R53.83 FATIGUE, UNSPECIFIED TYPE: ICD-10-CM

## 2024-10-14 LAB
25(OH)D3 SERPL-MCNC: 27 NG/ML (ref 30–100)
ALBUMIN SERPL BCP-MCNC: 4.1 G/DL (ref 3.4–5)
ALP SERPL-CCNC: 60 U/L (ref 33–136)
ALT SERPL W P-5'-P-CCNC: 16 U/L (ref 7–45)
ANION GAP SERPL CALC-SCNC: 15 MMOL/L (ref 10–20)
AST SERPL W P-5'-P-CCNC: 23 U/L (ref 9–39)
BASOPHILS # BLD AUTO: 0.06 X10*3/UL (ref 0–0.1)
BASOPHILS NFR BLD AUTO: 1 %
BILIRUB SERPL-MCNC: 0.6 MG/DL (ref 0–1.2)
BUN SERPL-MCNC: 22 MG/DL (ref 6–23)
CALCIUM SERPL-MCNC: 9 MG/DL (ref 8.6–10.3)
CHLORIDE SERPL-SCNC: 107 MMOL/L (ref 98–107)
CO2 SERPL-SCNC: 23 MMOL/L (ref 21–32)
CREAT SERPL-MCNC: 1.05 MG/DL (ref 0.5–1.05)
EGFRCR SERPLBLD CKD-EPI 2021: 56 ML/MIN/1.73M*2
EOSINOPHIL # BLD AUTO: 0.26 X10*3/UL (ref 0–0.4)
EOSINOPHIL NFR BLD AUTO: 4.3 %
ERYTHROCYTE [DISTWIDTH] IN BLOOD BY AUTOMATED COUNT: 13.6 % (ref 11.5–14.5)
GLUCOSE SERPL-MCNC: 89 MG/DL (ref 74–99)
HCT VFR BLD AUTO: 38 % (ref 36–46)
HGB BLD-MCNC: 12.2 G/DL (ref 12–16)
IMM GRANULOCYTES # BLD AUTO: 0.02 X10*3/UL (ref 0–0.5)
IMM GRANULOCYTES NFR BLD AUTO: 0.3 % (ref 0–0.9)
LYMPHOCYTES # BLD AUTO: 1.14 X10*3/UL (ref 0.8–3)
LYMPHOCYTES NFR BLD AUTO: 18.8 %
MCH RBC QN AUTO: 30.7 PG (ref 26–34)
MCHC RBC AUTO-ENTMCNC: 32.1 G/DL (ref 32–36)
MCV RBC AUTO: 96 FL (ref 80–100)
MONOCYTES # BLD AUTO: 0.63 X10*3/UL (ref 0.05–0.8)
MONOCYTES NFR BLD AUTO: 10.4 %
NEUTROPHILS # BLD AUTO: 3.97 X10*3/UL (ref 1.6–5.5)
NEUTROPHILS NFR BLD AUTO: 65.2 %
NRBC BLD-RTO: 0 /100 WBCS (ref 0–0)
PLATELET # BLD AUTO: 274 X10*3/UL (ref 150–450)
POTASSIUM SERPL-SCNC: 4.5 MMOL/L (ref 3.5–5.3)
PROT SERPL-MCNC: 6.6 G/DL (ref 6.4–8.2)
RBC # BLD AUTO: 3.97 X10*6/UL (ref 4–5.2)
SODIUM SERPL-SCNC: 140 MMOL/L (ref 136–145)
TSH SERPL-ACNC: 2.29 MIU/L (ref 0.44–3.98)
VIT B12 SERPL-MCNC: 166 PG/ML (ref 211–911)
WBC # BLD AUTO: 6.1 X10*3/UL (ref 4.4–11.3)

## 2024-10-14 PROCEDURE — 82607 VITAMIN B-12: CPT

## 2024-10-14 PROCEDURE — 80053 COMPREHEN METABOLIC PANEL: CPT

## 2024-10-14 PROCEDURE — 85025 COMPLETE CBC W/AUTO DIFF WBC: CPT

## 2024-10-14 PROCEDURE — 36415 COLL VENOUS BLD VENIPUNCTURE: CPT

## 2024-10-14 PROCEDURE — 84443 ASSAY THYROID STIM HORMONE: CPT

## 2024-10-14 PROCEDURE — 82306 VITAMIN D 25 HYDROXY: CPT

## 2024-10-23 ENCOUNTER — OFFICE VISIT (OUTPATIENT)
Dept: PRIMARY CARE | Facility: CLINIC | Age: 74
End: 2024-10-23
Payer: MEDICARE

## 2024-10-23 VITALS
OXYGEN SATURATION: 96 % | HEIGHT: 64 IN | SYSTOLIC BLOOD PRESSURE: 124 MMHG | BODY MASS INDEX: 35.17 KG/M2 | WEIGHT: 206 LBS | DIASTOLIC BLOOD PRESSURE: 72 MMHG | TEMPERATURE: 98.1 F | HEART RATE: 67 BPM

## 2024-10-23 DIAGNOSIS — K46.9 ABDOMINAL HERNIA WITHOUT OBSTRUCTION AND WITHOUT GANGRENE, RECURRENCE NOT SPECIFIED, UNSPECIFIED HERNIA TYPE: ICD-10-CM

## 2024-10-23 DIAGNOSIS — E66.01 CLASS 2 SEVERE OBESITY DUE TO EXCESS CALORIES WITH SERIOUS COMORBIDITY AND BODY MASS INDEX (BMI) OF 35.0 TO 35.9 IN ADULT: ICD-10-CM

## 2024-10-23 DIAGNOSIS — I65.29 STENOSIS OF CAROTID ARTERY, UNSPECIFIED LATERALITY: ICD-10-CM

## 2024-10-23 DIAGNOSIS — K64.9 HEMORRHOIDS, UNSPECIFIED HEMORRHOID TYPE: ICD-10-CM

## 2024-10-23 DIAGNOSIS — I71.21 ANEURYSM OF ASCENDING AORTA WITHOUT RUPTURE (CMS-HCC): ICD-10-CM

## 2024-10-23 DIAGNOSIS — R73.03 PREDIABETES: ICD-10-CM

## 2024-10-23 DIAGNOSIS — E66.812 CLASS 2 SEVERE OBESITY DUE TO EXCESS CALORIES WITH SERIOUS COMORBIDITY AND BODY MASS INDEX (BMI) OF 35.0 TO 35.9 IN ADULT: ICD-10-CM

## 2024-10-23 DIAGNOSIS — R14.0 ABDOMINAL BLOATING: ICD-10-CM

## 2024-10-23 DIAGNOSIS — Z23 ENCOUNTER FOR IMMUNIZATION: ICD-10-CM

## 2024-10-23 DIAGNOSIS — E78.2 MIXED HYPERLIPIDEMIA: ICD-10-CM

## 2024-10-23 DIAGNOSIS — M06.9 RHEUMATOID ARTHRITIS, INVOLVING UNSPECIFIED SITE, UNSPECIFIED WHETHER RHEUMATOID FACTOR PRESENT (MULTI): ICD-10-CM

## 2024-10-23 DIAGNOSIS — I45.2 BIFASCICULAR BLOCK: ICD-10-CM

## 2024-10-23 DIAGNOSIS — F32.1 MODERATE MAJOR DEPRESSION (MULTI): ICD-10-CM

## 2024-10-23 DIAGNOSIS — L40.9 PSORIASIS: ICD-10-CM

## 2024-10-23 DIAGNOSIS — R53.83 FATIGUE, UNSPECIFIED TYPE: Primary | ICD-10-CM

## 2024-10-23 DIAGNOSIS — E55.9 VITAMIN D DEFICIENCY: ICD-10-CM

## 2024-10-23 DIAGNOSIS — G47.33 OSA (OBSTRUCTIVE SLEEP APNEA): ICD-10-CM

## 2024-10-23 DIAGNOSIS — E53.8 VITAMIN B12 DEFICIENCY: ICD-10-CM

## 2024-10-23 DIAGNOSIS — I10 ESSENTIAL HYPERTENSION: ICD-10-CM

## 2024-10-23 PROCEDURE — G2211 COMPLEX E/M VISIT ADD ON: HCPCS | Performed by: FAMILY MEDICINE

## 2024-10-23 PROCEDURE — 3008F BODY MASS INDEX DOCD: CPT | Performed by: FAMILY MEDICINE

## 2024-10-23 PROCEDURE — 3078F DIAST BP <80 MM HG: CPT | Performed by: FAMILY MEDICINE

## 2024-10-23 PROCEDURE — G0008 ADMIN INFLUENZA VIRUS VAC: HCPCS | Performed by: FAMILY MEDICINE

## 2024-10-23 PROCEDURE — 1160F RVW MEDS BY RX/DR IN RCRD: CPT | Performed by: FAMILY MEDICINE

## 2024-10-23 PROCEDURE — 1036F TOBACCO NON-USER: CPT | Performed by: FAMILY MEDICINE

## 2024-10-23 PROCEDURE — 99214 OFFICE O/P EST MOD 30 MIN: CPT | Performed by: FAMILY MEDICINE

## 2024-10-23 PROCEDURE — 1159F MED LIST DOCD IN RCRD: CPT | Performed by: FAMILY MEDICINE

## 2024-10-23 PROCEDURE — 90662 IIV NO PRSV INCREASED AG IM: CPT | Performed by: FAMILY MEDICINE

## 2024-10-23 PROCEDURE — 3074F SYST BP LT 130 MM HG: CPT | Performed by: FAMILY MEDICINE

## 2024-10-23 PROCEDURE — 96372 THER/PROPH/DIAG INJ SC/IM: CPT | Performed by: FAMILY MEDICINE

## 2024-10-23 RX ORDER — CYANOCOBALAMIN 1000 UG/ML
1000 INJECTION, SOLUTION INTRAMUSCULAR; SUBCUTANEOUS
Qty: 3 ML | Refills: 1 | Status: SHIPPED | OUTPATIENT
Start: 2024-10-23 | End: 2025-04-21

## 2024-10-23 RX ORDER — HYDROCORTISONE ACETATE 25 MG/1
25 SUPPOSITORY RECTAL 2 TIMES DAILY PRN
Qty: 30 SUPPOSITORY | Refills: 0 | Status: SHIPPED | OUTPATIENT
Start: 2024-10-23 | End: 2024-11-07

## 2024-10-23 RX ORDER — ERGOCALCIFEROL 1.25 MG/1
1.25 CAPSULE ORAL
Qty: 12 CAPSULE | Refills: 1 | Status: SHIPPED | OUTPATIENT
Start: 2024-10-27 | End: 2025-04-25

## 2024-10-23 RX ORDER — CYANOCOBALAMIN 1000 UG/ML
1000 INJECTION, SOLUTION INTRAMUSCULAR; SUBCUTANEOUS ONCE
Status: COMPLETED | OUTPATIENT
Start: 2024-10-23 | End: 2024-10-23

## 2024-10-23 ASSESSMENT — ENCOUNTER SYMPTOMS
SORE THROAT: 0
CONSTIPATION: 0
NAUSEA: 0
UNEXPECTED WEIGHT CHANGE: 0
SINUS PAIN: 0
COUGH: 0
ABDOMINAL PAIN: 0
SINUS PRESSURE: 0
DIZZINESS: 0
HEMATURIA: 0
NERVOUS/ANXIOUS: 0
DYSURIA: 0
DIAPHORESIS: 0
VOMITING: 0
LIGHT-HEADEDNESS: 0
ADENOPATHY: 0
NUMBNESS: 0
HEADACHES: 0
POLYPHAGIA: 0
CHILLS: 0
SHORTNESS OF BREATH: 0
WHEEZING: 0
FEVER: 0
DYSPHORIC MOOD: 0
FREQUENCY: 0
CONFUSION: 0
POLYDIPSIA: 0
PALPITATIONS: 0
DIARRHEA: 0
CHEST TIGHTNESS: 0
FATIGUE: 1

## 2024-10-23 ASSESSMENT — PATIENT HEALTH QUESTIONNAIRE - PHQ9
2. FEELING DOWN, DEPRESSED OR HOPELESS: NOT AT ALL
SUM OF ALL RESPONSES TO PHQ9 QUESTIONS 1 AND 2: 0
1. LITTLE INTEREST OR PLEASURE IN DOING THINGS: NOT AT ALL

## 2024-10-23 NOTE — ASSESSMENT & PLAN NOTE
- moderate to severe sleep apnea on sleep study  - having a difficult time tolerating her APAP.   - refer to sleep medicine

## 2024-10-23 NOTE — PATIENT INSTRUCTIONS
Felecia Storey ,    Thank you for coming in today. We at [facility] appreciate your trust in our care. If you have any questions or concerns about the care you received today, please do not hesitate to contact us at [phone].    The following instructions were discussed today:    - get in to see Dr. Elizabeth as soon as you can  - start vitamin B12 shots once a month. You had one today, so next one is due in one month  - stop vitamin D3 5000 IU daily  - START vitamin D2 50,000 IU weekly   - increase fluoxetine to 60 mg  - refer to sleep medicine   - refer to general surgery   - get ultrasound and belly and pelvis  - Follow up in 3 months.    - Please get blood work done 1-2 weeks prior to your next visit. For blood work: Nothing to eat or drink for at least 10 hours prior. Okay for water or black coffee.

## 2024-10-23 NOTE — PROGRESS NOTES
Subjective   Patient ID: Felecia Storey is a 74 y.o. female who presents for Follow-up (She has a aorta anurysm. Hasn't felt good at all. Having episodes of feeling weak and more and more tired and feels like she's gonna pass out. She's exhuasted. Not sure if its her back or rhuematoid arthritis. But knows something is wrong. Has weakness too. When she gets out of the shower feels like she's gonna pass out. /Go over blood work/).    HPI   routine follow up. chronic issues as per assessment and plan.     She is feeling very exhausted. Sleep study done 8/2023 showed moderate-severe sleep apnea. She has been unable to tolerate APAP. Discussed with her referral to sleep medicine in the past and she has declined. She is also under a lot of stress. Has a lot of worries and feeling anxious. Her daughter is having a difficult time in her manage and her son-in-law has been ill. Is on fluoxetine 40 mg (has been prescribed 60 mg but only taking 40 mg)    Reviewed 10/14/24 labs --> vitamin D 27 (27) (19); vitamin B12 166 (224) (202); Cr 1.05 (0.87); GFR 56 (71); normal CBC    Review of Systems   Constitutional:  Positive for fatigue. Negative for chills, diaphoresis, fever and unexpected weight change.   HENT:  Negative for congestion, sinus pressure, sinus pain, sneezing and sore throat.    Respiratory:  Negative for cough, chest tightness, shortness of breath and wheezing.    Cardiovascular:  Negative for chest pain, palpitations and leg swelling.   Gastrointestinal:  Negative for abdominal pain, constipation, diarrhea, nausea and vomiting.   Endocrine: Negative for cold intolerance, heat intolerance, polydipsia, polyphagia and polyuria.   Genitourinary:  Negative for dysuria, frequency, hematuria and urgency.   Neurological:  Negative for dizziness, syncope, light-headedness, numbness and headaches.   Hematological:  Negative for adenopathy.   Psychiatric/Behavioral:  Negative for confusion and dysphoric mood. The patient  "is not nervous/anxious.        Objective   /72 (BP Location: Left arm, Patient Position: Sitting, BP Cuff Size: Adult)   Pulse 67   Temp 36.7 °C (98.1 °F)   Ht 1.613 m (5' 3.5\")   Wt 93.4 kg (206 lb)   SpO2 96%   BMI 35.92 kg/m²     Physical Exam  Vitals and nursing note reviewed.   Constitutional:       General: She is not in acute distress.     Appearance: Normal appearance.   HENT:      Head: Normocephalic and atraumatic.      Nose: Nose normal.   Eyes:      Extraocular Movements: Extraocular movements intact.      Conjunctiva/sclera: Conjunctivae normal.      Pupils: Pupils are equal, round, and reactive to light.   Cardiovascular:      Rate and Rhythm: Normal rate and regular rhythm.      Heart sounds: No murmur heard.     No friction rub. No gallop.   Pulmonary:      Effort: Pulmonary effort is normal.      Breath sounds: Normal breath sounds. No wheezing, rhonchi or rales.   Abdominal:      General: Bowel sounds are normal. There is no distension.      Palpations: Abdomen is soft.      Tenderness: There is no abdominal tenderness.   Musculoskeletal:         General: Normal range of motion.      Cervical back: Normal range of motion and neck supple.   Skin:     General: Skin is warm and dry.   Neurological:      General: No focal deficit present.      Mental Status: She is alert and oriented to person, place, and time.      Deep Tendon Reflexes: Reflexes normal.   Psychiatric:         Mood and Affect: Mood normal.         Behavior: Behavior normal.         Thought Content: Thought content normal.         Judgment: Judgment normal.         Assessment/Plan   Problem List Items Addressed This Visit             ICD-10-CM    Abdominal bloating R14.0     - check abdominal and pelvic ultrasound         Relevant Orders    Comprehensive Metabolic Panel    TSH with reflex to Free T4 if abnormal    CBC and Auto Differential    US abdomen complete    US pelvis    US pelvis transvaginal    Abdominal hernia " K46.9     - refer to general surgery          Relevant Orders    Comprehensive Metabolic Panel    TSH with reflex to Free T4 if abnormal    CBC and Auto Differential    Referral to General Surgery    Aneurysm of ascending aorta without rupture (CMS-HCC) I71.21     - follows with cardiology, Dr. Elizabeth  - with her increased fatigue, I recommended she get in to see Dr. Elizabeth as soon as she can         Relevant Orders    Comprehensive Metabolic Panel    TSH with reflex to Free T4 if abnormal    CBC and Auto Differential    Bifascicular block I45.2     - follows with cardiology, Dr. Elizabeth  - with her increased fatigue, I recommended she get in to see Dr. Elizabeth as soon as she can         Relevant Orders    Comprehensive Metabolic Panel    TSH with reflex to Free T4 if abnormal    CBC and Auto Differential    BMI 35.0-35.9,adult Z68.35     - Encouraged healthy lifestyle, including adequate exercise and high fiber, low fat and low carb diet.          Relevant Orders    Comprehensive Metabolic Panel    TSH with reflex to Free T4 if abnormal    CBC and Auto Differential    Carotid artery stenosis I65.29     - follows with cardiology, Dr. Elizabeth  - with her increased fatigue, I recommended she get in to see Dr. Elizabeth as soon as she can         Relevant Orders    Comprehensive Metabolic Panel    TSH with reflex to Free T4 if abnormal    CBC and Auto Differential    Class 2 severe obesity due to excess calories with serious comorbidity and body mass index (BMI) of 35.0 to 35.9 in adult E66.812, E66.01, Z68.35     - Encouraged healthy lifestyle, including adequate exercise and high fiber, low fat and low carb diet.          Relevant Orders    Comprehensive Metabolic Panel    TSH with reflex to Free T4 if abnormal    CBC and Auto Differential    Encounter for immunization Z23     - flu shot administered today          Relevant Orders    Flu vaccine, trivalent, preservative free, HIGH-DOSE, age 65y+ (Fluzone) (Completed)     Essential hypertension I10     - controlled  - current regimen: lisinopril 20 mg daily         Relevant Orders    Comprehensive Metabolic Panel    TSH with reflex to Free T4 if abnormal    CBC and Auto Differential    Fatigue - Primary R53.83     - likely secondary to weight gain, deconditioning, untreated sleep apnea, vitamin deficiencies  - will supplement vitamin D and vitamin B12   - patient to follow up with her cardiologist to have cardiac testing           Relevant Orders    Comprehensive Metabolic Panel    TSH with reflex to Free T4 if abnormal    CBC and Auto Differential    Mixed hyperlipidemia E78.2     - cardiology has started her on atorvastatin and her cholesterol has improved. LDL 87 (125) (183)         Relevant Orders    Comprehensive Metabolic Panel    Lipid Panel    TSH with reflex to Free T4 if abnormal    CBC and Auto Differential    Moderate major depression (Multi) F32.1     - suboptimally controlled  - recommended she increase her fluoxetine to 60 mg and she is agreeable          Relevant Orders    Comprehensive Metabolic Panel    TSH with reflex to Free T4 if abnormal    CBC and Auto Differential    RICHARD (obstructive sleep apnea) G47.33     - moderate to severe sleep apnea on sleep study  - having a difficult time tolerating her APAP.   - refer to sleep medicine          Relevant Orders    Comprehensive Metabolic Panel    TSH with reflex to Free T4 if abnormal    CBC and Auto Differential    Referral to Adult Sleep Medicine    Prediabetes R73.03     - HbA1c 5.9 (6)(6) (6.3)  - Encouraged healthy lifestyle, including adequate exercise and high fiber, low fat and low carb diet.            Relevant Orders    Comprehensive Metabolic Panel    TSH with reflex to Free T4 if abnormal    CBC and Auto Differential    Psoriasis L40.9     - follows with dermatology          Relevant Orders    Comprehensive Metabolic Panel    TSH with reflex to Free T4 if abnormal    CBC and Auto Differential    Rheumatoid  "arthritis M06.9     - stable. has seen Dr. Emerson in the past but not currenty. she does not want to take any of the RA medications  - discussed referral to rheumatology but she declines at this time          Relevant Orders    Comprehensive Metabolic Panel    TSH with reflex to Free T4 if abnormal    CBC and Auto Differential    Vitamin B12 deficiency E53.8     - start vitamin B12 1000 mcg injections monthly   - recheck B12 level in 3 months          Relevant Medications    cyanocobalamin (Vitamin B-12) injection 1,000 mcg (Completed)    cyanocobalamin (Vitamin B-12) 1,000 mcg/mL injection    syringe-needle,safety,disp unt 3 mL 25 gauge x 1\" syringe    Other Relevant Orders    Vitamin B12    Comprehensive Metabolic Panel    TSH with reflex to Free T4 if abnormal    CBC and Auto Differential    Vitamin D deficiency E55.9     - vitamin D still low at 27  - stop vitamin D3 5000 IU daily  - START vitamin D2 50,000 IU weekly          Relevant Medications    ergocalciferol (Vitamin D-2) 1.25 MG (86876 UT) capsule (Start on 10/27/2024)    Other Relevant Orders    Vitamin D 25-Hydroxy,Total (for eval of Vitamin D levels)    Comprehensive Metabolic Panel    TSH with reflex to Free T4 if abnormal    CBC and Auto Differential     Other Visit Diagnoses         Codes    Hemorrhoids, unspecified hemorrhoid type     K64.9    Relevant Medications    hydrocortisone (Anusol-HC) 25 mg suppository               "

## 2024-10-23 NOTE — ASSESSMENT & PLAN NOTE
- suboptimally controlled  - recommended she increase her fluoxetine to 60 mg and she is agreeable    · Continue with treatment

## 2024-10-23 NOTE — ASSESSMENT & PLAN NOTE
- vitamin D still low at 27  - stop vitamin D3 5000 IU daily  - START vitamin D2 50,000 IU weekly

## 2024-10-23 NOTE — ASSESSMENT & PLAN NOTE
- stable. has seen Dr. Emerson in the past but not currenty. she does not want to take any of the RA medications  - discussed referral to rheumatology but she declines at this time

## 2024-10-23 NOTE — ASSESSMENT & PLAN NOTE
- likely secondary to weight gain, deconditioning, untreated sleep apnea, vitamin deficiencies  - will supplement vitamin D and vitamin B12   - patient to follow up with her cardiologist to have cardiac testing

## 2024-10-23 NOTE — ASSESSMENT & PLAN NOTE
- HbA1c 5.9 (6)(6) (6.3)  - Encouraged healthy lifestyle, including adequate exercise and high fiber, low fat and low carb diet.

## 2024-10-23 NOTE — ASSESSMENT & PLAN NOTE
- follows with cardiology, Dr. Elizabeth  - with her increased fatigue, I recommended she get in to see Dr. Elizabeth as soon as she can

## 2024-10-25 ENCOUNTER — HOSPITAL ENCOUNTER (OUTPATIENT)
Dept: RADIOLOGY | Facility: CLINIC | Age: 74
Discharge: HOME | End: 2024-10-25
Payer: MEDICARE

## 2024-10-25 DIAGNOSIS — R14.0 ABDOMINAL BLOATING: ICD-10-CM

## 2024-10-25 PROCEDURE — 76856 US EXAM PELVIC COMPLETE: CPT

## 2024-10-25 PROCEDURE — 76700 US EXAM ABDOM COMPLETE: CPT

## 2024-10-28 ENCOUNTER — TELEPHONE (OUTPATIENT)
Dept: PRIMARY CARE | Facility: CLINIC | Age: 74
End: 2024-10-28
Payer: MEDICARE

## 2024-10-28 DIAGNOSIS — K76.0 FATTY LIVER: Primary | ICD-10-CM

## 2024-10-28 DIAGNOSIS — R14.0 ABDOMINAL BLOATING: ICD-10-CM

## 2024-10-31 ENCOUNTER — APPOINTMENT (OUTPATIENT)
Dept: RADIOLOGY | Facility: CLINIC | Age: 74
End: 2024-10-31
Payer: MEDICARE

## 2024-11-14 ENCOUNTER — HOSPITAL ENCOUNTER (OUTPATIENT)
Dept: RADIOLOGY | Facility: CLINIC | Age: 74
Discharge: HOME | End: 2024-11-14
Payer: MEDICARE

## 2024-11-14 DIAGNOSIS — K76.0 FATTY LIVER: ICD-10-CM

## 2024-11-14 PROCEDURE — 76981 USE PARENCHYMA: CPT

## 2024-11-14 PROCEDURE — 76981 USE PARENCHYMA: CPT | Performed by: RADIOLOGY

## 2024-11-18 ENCOUNTER — TELEPHONE (OUTPATIENT)
Dept: PRIMARY CARE | Facility: CLINIC | Age: 74
End: 2024-11-18

## 2024-11-18 ENCOUNTER — APPOINTMENT (OUTPATIENT)
Dept: PRIMARY CARE | Facility: CLINIC | Age: 74
End: 2024-11-18
Payer: MEDICARE

## 2024-11-18 DIAGNOSIS — K74.60 HEPATIC CIRRHOSIS, UNSPECIFIED HEPATIC CIRRHOSIS TYPE, UNSPECIFIED WHETHER ASCITES PRESENT (MULTI): Primary | ICD-10-CM

## 2024-11-18 NOTE — ASSESSMENT & PLAN NOTE
- 11/14/24 fibroscan --> Liver fibrosis staging is stage F4: Cirrhosis   - normal LFTs  - refer to hepatology    Lab Results   Component Value Date    ALT 16 10/14/2024    AST 23 10/14/2024    ALKPHOS 60 10/14/2024    BILITOT 0.6 10/14/2024

## 2024-11-18 NOTE — TELEPHONE ENCOUNTER
Please let patient know the fibroscan showed fibrosis of her liver, which can lead to cirrhosis. I recommend she see hepatology. Order placed.

## 2024-11-19 NOTE — TELEPHONE ENCOUNTER
Left message to call office,   Spoke with patient, she wants to wait on the referral.  She will call back if she wants us to fax to Hepatology.

## 2024-11-25 NOTE — TELEPHONE ENCOUNTER
Spoke with patient, she wants to use [facility].  When I called the location in [place], I was told the patient would need a GI referral and the number I was given was for the physicians who are GI but specialize in the liver.    [facility] location  Ph: [phone]  FX: [phone]

## 2025-01-23 ENCOUNTER — TELEPHONE (OUTPATIENT)
Dept: PRIMARY CARE | Facility: CLINIC | Age: 75
End: 2025-01-23

## 2025-01-23 ENCOUNTER — APPOINTMENT (OUTPATIENT)
Dept: PRIMARY CARE | Facility: CLINIC | Age: 75
End: 2025-01-23
Payer: MEDICARE

## 2025-01-23 VITALS
HEART RATE: 71 BPM | RESPIRATION RATE: 16 BRPM | OXYGEN SATURATION: 97 % | BODY MASS INDEX: 34.31 KG/M2 | SYSTOLIC BLOOD PRESSURE: 128 MMHG | DIASTOLIC BLOOD PRESSURE: 81 MMHG | HEIGHT: 64 IN | WEIGHT: 201 LBS

## 2025-01-23 DIAGNOSIS — E55.9 VITAMIN D DEFICIENCY: ICD-10-CM

## 2025-01-23 DIAGNOSIS — K76.0 FATTY LIVER: ICD-10-CM

## 2025-01-23 DIAGNOSIS — K74.60 CIRRHOSIS OF LIVER WITHOUT ASCITES, UNSPECIFIED HEPATIC CIRRHOSIS TYPE (MULTI): ICD-10-CM

## 2025-01-23 DIAGNOSIS — I65.29 STENOSIS OF CAROTID ARTERY, UNSPECIFIED LATERALITY: ICD-10-CM

## 2025-01-23 DIAGNOSIS — E53.8 VITAMIN B12 DEFICIENCY: ICD-10-CM

## 2025-01-23 DIAGNOSIS — E78.2 MIXED HYPERLIPIDEMIA: ICD-10-CM

## 2025-01-23 DIAGNOSIS — M06.9 RHEUMATOID ARTHRITIS, INVOLVING UNSPECIFIED SITE, UNSPECIFIED WHETHER RHEUMATOID FACTOR PRESENT (MULTI): ICD-10-CM

## 2025-01-23 DIAGNOSIS — G47.33 OSA (OBSTRUCTIVE SLEEP APNEA): ICD-10-CM

## 2025-01-23 DIAGNOSIS — I45.2 BIFASCICULAR BLOCK: ICD-10-CM

## 2025-01-23 DIAGNOSIS — I10 ESSENTIAL HYPERTENSION: ICD-10-CM

## 2025-01-23 DIAGNOSIS — E66.812 CLASS 2 SEVERE OBESITY DUE TO EXCESS CALORIES WITH SERIOUS COMORBIDITY AND BODY MASS INDEX (BMI) OF 35.0 TO 35.9 IN ADULT: ICD-10-CM

## 2025-01-23 DIAGNOSIS — Z11.59 NEED FOR HEPATITIS C SCREENING TEST: ICD-10-CM

## 2025-01-23 DIAGNOSIS — F32.1 MODERATE MAJOR DEPRESSION (MULTI): Primary | ICD-10-CM

## 2025-01-23 DIAGNOSIS — E66.01 CLASS 2 SEVERE OBESITY DUE TO EXCESS CALORIES WITH SERIOUS COMORBIDITY AND BODY MASS INDEX (BMI) OF 35.0 TO 35.9 IN ADULT: ICD-10-CM

## 2025-01-23 DIAGNOSIS — R73.03 PREDIABETES: ICD-10-CM

## 2025-01-23 PROBLEM — R14.0 ABDOMINAL BLOATING: Status: RESOLVED | Noted: 2024-10-23 | Resolved: 2025-01-23

## 2025-01-23 PROCEDURE — 99214 OFFICE O/P EST MOD 30 MIN: CPT | Performed by: FAMILY MEDICINE

## 2025-01-23 PROCEDURE — 1160F RVW MEDS BY RX/DR IN RCRD: CPT | Performed by: FAMILY MEDICINE

## 2025-01-23 PROCEDURE — 1159F MED LIST DOCD IN RCRD: CPT | Performed by: FAMILY MEDICINE

## 2025-01-23 PROCEDURE — G2211 COMPLEX E/M VISIT ADD ON: HCPCS | Performed by: FAMILY MEDICINE

## 2025-01-23 PROCEDURE — 3008F BODY MASS INDEX DOCD: CPT | Performed by: FAMILY MEDICINE

## 2025-01-23 PROCEDURE — 3079F DIAST BP 80-89 MM HG: CPT | Performed by: FAMILY MEDICINE

## 2025-01-23 PROCEDURE — 3074F SYST BP LT 130 MM HG: CPT | Performed by: FAMILY MEDICINE

## 2025-01-23 PROCEDURE — 1036F TOBACCO NON-USER: CPT | Performed by: FAMILY MEDICINE

## 2025-01-23 RX ORDER — ATORVASTATIN CALCIUM 10 MG/1
10 TABLET, FILM COATED ORAL NIGHTLY
Qty: 90 TABLET | Refills: 1 | Status: SHIPPED | OUTPATIENT
Start: 2025-01-23 | End: 2025-07-22

## 2025-01-23 RX ORDER — CYANOCOBALAMIN 1000 UG/ML
1000 INJECTION, SOLUTION INTRAMUSCULAR; SUBCUTANEOUS
Qty: 3 ML | Refills: 1 | Status: SHIPPED | OUTPATIENT
Start: 2025-01-23 | End: 2025-07-22

## 2025-01-23 RX ORDER — ERGOCALCIFEROL 1.25 MG/1
1.25 CAPSULE ORAL
Qty: 12 CAPSULE | Refills: 1 | Status: SHIPPED | OUTPATIENT
Start: 2025-01-26 | End: 2025-07-25

## 2025-01-23 RX ORDER — FLUOXETINE HYDROCHLORIDE 20 MG/1
60 CAPSULE ORAL DAILY
Qty: 270 CAPSULE | Refills: 1 | Status: SHIPPED | OUTPATIENT
Start: 2025-01-23 | End: 2025-07-22

## 2025-01-23 RX ORDER — LISINOPRIL 20 MG/1
20 TABLET ORAL DAILY
Qty: 90 TABLET | Refills: 1 | Status: SHIPPED | OUTPATIENT
Start: 2025-01-23 | End: 2025-07-22

## 2025-01-23 NOTE — TELEPHONE ENCOUNTER
Will fax today to [facility], also notified patient that they will handle her liver issues.

## 2025-01-23 NOTE — PATIENT INSTRUCTIONS
Felecia Storey ,    Thank you for coming in today. We at [facility] appreciate your trust in our care. If you have any questions or concerns about the care you received today, please do not hesitate to contact us at [phone].    The following instructions were discussed today:    - get labs. No need to fast  - Follow up 5/21/2025 as scheduled.

## 2025-01-23 NOTE — PROGRESS NOTES
"Subjective   Patient ID: Felecia Storey is a 74 y.o. female who presents for liver ultrasound discussion.    HPI   routine follow up. chronic issues as per assessment and plan.     Review of Systems    Objective   /81 (BP Location: Left arm, Patient Position: Sitting, BP Cuff Size: Large adult long)   Pulse 71   Resp 16   Ht 1.613 m (5' 3.5\")   Wt 91.2 kg (201 lb)   SpO2 97%   BMI 35.05 kg/m²     Physical Exam    Assessment/Plan          "

## 2025-01-23 NOTE — ASSESSMENT & PLAN NOTE
- 11/14/24 fibroscan --> Liver fibrosis staging is stage F4: Cirrhosis   - normal LFTs  - refer to hepatology

## 2025-01-23 NOTE — TELEPHONE ENCOUNTER
1) Patient is scheduled to see GI at [facility] for evaluation for screening colonoscopy. She now has cirrhosis per fibroscan. I called over to [facility] and they can see her for that as well. Please fax my office notes from 10/23/24 as well as from today (1/23/25). Please also fax 11/14/24 US elastography, 10/25/24 US abdomen, and 10/30/24 labs and 10/14/24 labs. Fax number is [phone]    2) please let Felecia know I spoke with [facility] and they will address her liver issues as well.

## 2025-03-24 DIAGNOSIS — E78.2 MIXED HYPERLIPIDEMIA: ICD-10-CM

## 2025-03-26 RX ORDER — ATORVASTATIN CALCIUM 10 MG/1
10 TABLET, FILM COATED ORAL NIGHTLY
Qty: 90 TABLET | Refills: 1 | Status: SHIPPED | OUTPATIENT
Start: 2025-03-26 | End: 2025-09-22

## 2025-03-26 RX ORDER — EZETIMIBE 10 MG/1
10 TABLET ORAL DAILY
Qty: 90 TABLET | Refills: 1 | Status: SHIPPED | OUTPATIENT
Start: 2025-03-26 | End: 2025-09-22

## 2025-04-24 RX ORDER — SYRINGE WITH NEEDLE, 1 ML 25GX5/8"
SYRINGE, EMPTY DISPOSABLE MISCELLANEOUS
Refills: 0 | OUTPATIENT
Start: 2025-04-24

## 2025-05-21 ENCOUNTER — APPOINTMENT (OUTPATIENT)
Dept: PRIMARY CARE | Facility: CLINIC | Age: 75
End: 2025-05-21
Payer: MEDICARE

## 2025-05-29 ENCOUNTER — TELEPHONE (OUTPATIENT)
Dept: PRIMARY CARE | Facility: CLINIC | Age: 75
End: 2025-05-29
Payer: MEDICARE

## 2025-05-29 DIAGNOSIS — R05.9 COUGH, UNSPECIFIED TYPE: Primary | ICD-10-CM

## 2025-05-29 NOTE — TELEPHONE ENCOUNTER
I placed an order for x-ray, but this is really something she needs to be seen for. Please get her scheduled or recommend urgent care.

## 2025-05-29 NOTE — TELEPHONE ENCOUNTER
Can you order a chest xray? She is scheduled for 9/3/25 but has been wheezing and coughing and has been especially fatigued. She is wanting to make sure she doesn't have something more going on.

## 2025-06-05 NOTE — ASSESSMENT & PLAN NOTE
- cholesterol continues to be high but improved.  (189) (254)  - continue zetia  - have discusseded statins in the past  - she refuses statin because of concerns of side effects   Complains of frequent voiding and incontinence follows urology

## 2025-06-25 ENCOUNTER — PATIENT OUTREACH (OUTPATIENT)
Dept: CARE COORDINATION | Facility: CLINIC | Age: 75
End: 2025-06-25
Payer: MEDICARE

## 2025-06-25 DIAGNOSIS — Z12.31 ENCOUNTER FOR SCREENING MAMMOGRAM FOR BREAST CANCER: ICD-10-CM

## 2025-07-21 ENCOUNTER — TELEPHONE (OUTPATIENT)
Dept: PRIMARY CARE | Facility: CLINIC | Age: 75
End: 2025-07-21
Payer: MEDICARE

## 2025-07-21 DIAGNOSIS — Z11.59 NEED FOR HEPATITIS C SCREENING TEST: ICD-10-CM

## 2025-07-21 DIAGNOSIS — I10 ESSENTIAL HYPERTENSION: ICD-10-CM

## 2025-07-21 DIAGNOSIS — R73.03 PREDIABETES: Primary | ICD-10-CM

## 2025-07-21 DIAGNOSIS — E53.8 VITAMIN B12 DEFICIENCY: ICD-10-CM

## 2025-07-21 DIAGNOSIS — E55.9 VITAMIN D DEFICIENCY: ICD-10-CM

## 2025-07-21 DIAGNOSIS — M06.9 RHEUMATOID ARTHRITIS, INVOLVING UNSPECIFIED SITE, UNSPECIFIED WHETHER RHEUMATOID FACTOR PRESENT (MULTI): ICD-10-CM

## 2025-07-21 NOTE — TELEPHONE ENCOUNTER
Pt lm regarding blood work she wanted to make sure vitamin b12, vitamin d, and A1C was ordered, I show that vitamin b12 and vitamin d were already ordered, did you want to order A1C for her or just check in office?

## 2025-07-22 DIAGNOSIS — K74.60 HEPATIC CIRRHOSIS, UNSPECIFIED HEPATIC CIRRHOSIS TYPE, UNSPECIFIED WHETHER ASCITES PRESENT (MULTI): Primary | ICD-10-CM

## 2025-07-22 RX ORDER — FUROSEMIDE 40 MG/1
40 TABLET ORAL 3 TIMES WEEKLY
Qty: 36 TABLET | Refills: 1 | Status: SHIPPED | OUTPATIENT
Start: 2025-07-23

## 2025-07-22 RX ORDER — FUROSEMIDE 40 MG/1
40 TABLET ORAL 3 TIMES WEEKLY
COMMUNITY
End: 2025-07-22 | Stop reason: SDUPTHER

## 2025-08-02 LAB
25(OH)D3+25(OH)D2 SERPL-MCNC: 34 NG/ML (ref 30–100)
ALBUMIN SERPL-MCNC: 4.1 G/DL (ref 3.6–5.1)
ALP SERPL-CCNC: 58 U/L (ref 37–153)
ALT SERPL-CCNC: 9 U/L (ref 6–29)
ANION GAP SERPL CALCULATED.4IONS-SCNC: 14 MMOL/L (CALC) (ref 7–17)
AST SERPL-CCNC: 14 U/L (ref 10–35)
BASOPHILS # BLD AUTO: 63 CELLS/UL (ref 0–200)
BASOPHILS NFR BLD AUTO: 1.1 %
BILIRUB SERPL-MCNC: 0.5 MG/DL (ref 0.2–1.2)
BUN SERPL-MCNC: 25 MG/DL (ref 7–25)
CALCIUM SERPL-MCNC: 9.4 MG/DL (ref 8.6–10.4)
CHLORIDE SERPL-SCNC: 106 MMOL/L (ref 98–110)
CO2 SERPL-SCNC: 20 MMOL/L (ref 20–32)
CREAT SERPL-MCNC: 0.85 MG/DL (ref 0.6–1)
EGFRCR SERPLBLD CKD-EPI 2021: 72 ML/MIN/1.73M2
EOSINOPHIL # BLD AUTO: 348 CELLS/UL (ref 15–500)
EOSINOPHIL NFR BLD AUTO: 6.1 %
ERYTHROCYTE [DISTWIDTH] IN BLOOD BY AUTOMATED COUNT: 14.2 % (ref 11–15)
EST. AVERAGE GLUCOSE BLD GHB EST-MCNC: 126 MG/DL
EST. AVERAGE GLUCOSE BLD GHB EST-SCNC: 7 MMOL/L
GLUCOSE SERPL-MCNC: 91 MG/DL (ref 65–99)
HBA1C MFR BLD: 6 %
HCT VFR BLD AUTO: 38 % (ref 35–45)
HCV AB SERPL QL IA: NORMAL
HGB BLD-MCNC: 11.9 G/DL (ref 11.7–15.5)
LYMPHOCYTES # BLD AUTO: 1157 CELLS/UL (ref 850–3900)
LYMPHOCYTES NFR BLD AUTO: 20.3 %
MCH RBC QN AUTO: 29.8 PG (ref 27–33)
MCHC RBC AUTO-ENTMCNC: 31.3 G/DL (ref 32–36)
MCV RBC AUTO: 95.2 FL (ref 80–100)
MONOCYTES # BLD AUTO: 587 CELLS/UL (ref 200–950)
MONOCYTES NFR BLD AUTO: 10.3 %
NEUTROPHILS # BLD AUTO: 3545 CELLS/UL (ref 1500–7800)
NEUTROPHILS NFR BLD AUTO: 62.2 %
PLATELET # BLD AUTO: 295 THOUSAND/UL (ref 140–400)
PMV BLD REES-ECKER: 9.7 FL (ref 7.5–12.5)
POTASSIUM SERPL-SCNC: 4.4 MMOL/L (ref 3.5–5.3)
PROT SERPL-MCNC: 6.5 G/DL (ref 6.1–8.1)
RBC # BLD AUTO: 3.99 MILLION/UL (ref 3.8–5.1)
SODIUM SERPL-SCNC: 140 MMOL/L (ref 135–146)
VIT B12 SERPL-MCNC: 350 PG/ML (ref 200–1100)
WBC # BLD AUTO: 5.7 THOUSAND/UL (ref 3.8–10.8)

## 2025-09-03 ENCOUNTER — APPOINTMENT (OUTPATIENT)
Dept: PRIMARY CARE | Facility: CLINIC | Age: 75
End: 2025-09-03
Payer: MEDICARE

## 2025-09-03 PROBLEM — R53.83 FATIGUE: Status: RESOLVED | Noted: 2024-10-04 | Resolved: 2025-09-03

## 2025-09-03 PROBLEM — Z13.820 OSTEOPOROSIS SCREENING: Status: ACTIVE | Noted: 2025-09-03

## 2025-09-03 ASSESSMENT — ENCOUNTER SYMPTOMS
FEVER: 0
CHILLS: 0
NUMBNESS: 0
SHORTNESS OF BREATH: 0
WHEEZING: 0
VOMITING: 0
HEMATURIA: 0
SINUS PAIN: 0
NAUSEA: 0
SORE THROAT: 0
CHEST TIGHTNESS: 0
ADENOPATHY: 0
DYSPHORIC MOOD: 0
ABDOMINAL PAIN: 0
HEADACHES: 0
SINUS PRESSURE: 0
FREQUENCY: 0
LIGHT-HEADEDNESS: 0
DYSURIA: 0
COUGH: 0
ARTHRALGIAS: 1
UNEXPECTED WEIGHT CHANGE: 0
POLYPHAGIA: 0
NERVOUS/ANXIOUS: 0
POLYDIPSIA: 0
DIARRHEA: 0
PALPITATIONS: 0
DIZZINESS: 0
DIAPHORESIS: 0
CONSTIPATION: 0
CONFUSION: 0

## 2026-01-12 ENCOUNTER — APPOINTMENT (OUTPATIENT)
Dept: PRIMARY CARE | Facility: CLINIC | Age: 76
End: 2026-01-12
Payer: MEDICARE

## 2026-09-08 ENCOUNTER — APPOINTMENT (OUTPATIENT)
Dept: PRIMARY CARE | Facility: CLINIC | Age: 76
End: 2026-09-08
Payer: MEDICARE